# Patient Record
Sex: MALE | Race: WHITE | NOT HISPANIC OR LATINO | Employment: FULL TIME | URBAN - METROPOLITAN AREA
[De-identification: names, ages, dates, MRNs, and addresses within clinical notes are randomized per-mention and may not be internally consistent; named-entity substitution may affect disease eponyms.]

---

## 2017-11-18 ENCOUNTER — ALLIED HEALTH/NURSE VISIT (OUTPATIENT)
Dept: NURSING | Facility: CLINIC | Age: 42
End: 2017-11-18
Payer: COMMERCIAL

## 2017-11-18 DIAGNOSIS — Z23 NEED FOR PROPHYLACTIC VACCINATION AND INOCULATION AGAINST INFLUENZA: Primary | ICD-10-CM

## 2017-11-18 PROCEDURE — 99207 ZZC NO CHARGE NURSE ONLY: CPT

## 2017-11-18 PROCEDURE — 90686 IIV4 VACC NO PRSV 0.5 ML IM: CPT

## 2017-11-18 PROCEDURE — 90471 IMMUNIZATION ADMIN: CPT

## 2017-11-18 NOTE — MR AVS SNAPSHOT
"              After Visit Summary   2017    Denny Jackson    MRN: [id number]           Patient Information     Date Of Birth          1975        Visit Information        Provider Department      2017 11:30 [place] [facility] [place]        Today's Diagnoses     Need for prophylactic vaccination and inoculation against influenza    -  1       Follow-ups after your visit        Who to contact     If you have questions or need follow up information about today's clinic visit or your schedule please contact [facility] directly at [phone].  Normal or non-critical lab and imaging results will be communicated to you by [employer], letter or phone within 4 business days after the clinic has received the results. If you do not hear from us within 7 days, please contact the clinic through [employer] or phone. If you have a critical or abnormal lab result, we will notify you by phone as soon as possible.  Submit refill requests through [email or web address] or call your pharmacy and they will forward the refill request to us. Please allow 3 business days for your refill to be completed.          Additional Information About Your Visit        MyChart Information     [email or web address] lets you send messages to your doctor, view your test results, renew your prescriptions, schedule appointments and more. To sign up, go to [email or web address] . Click on \"Log in\" on the left side of the screen, which will take you to the Welcome page. Then click on \"Sign up Now\" on the right side of the page.     You will be asked to enter the access code listed below, as well as some personal information. Please follow the directions to create your username and password.     Your access code is: [email or web address]  Expires: 2018 11:49 AM     Your access code will  in 90 days. If you need help or a new code, please call your [facility] or [phone].        Care EveryWhere ID     This is your Care " EveryWhere ID. This could be used by other organizations to access your [facility] medical records  [id number]         Blood Pressure from Last 3 Encounters:   03/24/16 110/78   03/02/16 130/88   02/22/13 116/70    Weight from Last 3 Encounters:   03/24/16 188 lb (85.3 kg)   03/02/16 189 lb (85.7 kg)   02/22/13 190 lb (86.2 kg)              We Performed the Following     FLU VAC, SPLIT VIRUS IM > 3 YO (QUADRIVALENT) [[id number]]        Primary Care Provider Office Phone # Fax #    Shante Branch [phone] [address] [facility] [address]        Equal Access to Services     JONAH JON : Hadii srikanth gonzaleso Lorenza, waaxda luqadaha, qaybta kaalmada adeegyada, uli massey . So [facility] [phone].    ATENCIÓN: Si habla español, tiene a miller disposición servicios gratuitos de asistencia lingüística. Mariah al [phone].    We comply with applicable federal civil rights laws and [place] laws. We do not discriminate on the basis of race, color, national origin, age, disability, sex, sexual orientation, or gender identity.            Thank you!     Thank you for choosing [facility]  for your care. Our goal is always to provide you with excellent care. Hearing back from our patients is one way we can continue to improve our services. Please take a few minutes to complete the written survey that you may receive in the mail after your visit with us. Thank you!             Your Updated Medication List - Protect others around you: Learn how to safely use, store and throw away your medicines at www.disposemymeds.org.      Notice  As of 11/18/2017 11:49 AM    You have not been prescribed any medications.

## 2017-11-18 NOTE — PROGRESS NOTES

## 2018-02-23 ENCOUNTER — OFFICE VISIT (OUTPATIENT)
Dept: FAMILY MEDICINE | Facility: CLINIC | Age: 43
End: 2018-02-23
Payer: COMMERCIAL

## 2018-02-23 VITALS
HEART RATE: 78 BPM | BODY MASS INDEX: 26.4 KG/M2 | SYSTOLIC BLOOD PRESSURE: 136 MMHG | TEMPERATURE: 98.3 F | DIASTOLIC BLOOD PRESSURE: 85 MMHG | RESPIRATION RATE: 14 BRPM | OXYGEN SATURATION: 99 % | WEIGHT: 184 LBS

## 2018-02-23 DIAGNOSIS — K13.29 TONGUE PLAQUE: ICD-10-CM

## 2018-02-23 DIAGNOSIS — B97.89 VIRAL SINUSITIS: Primary | ICD-10-CM

## 2018-02-23 DIAGNOSIS — N48.9 PENILE LESION: ICD-10-CM

## 2018-02-23 DIAGNOSIS — J32.9 VIRAL SINUSITIS: Primary | ICD-10-CM

## 2018-02-23 LAB
KOH PREP SPEC: NORMAL
SPECIMEN SOURCE: NORMAL

## 2018-02-23 PROCEDURE — 99213 OFFICE O/P EST LOW 20 MIN: CPT | Mod: 25 | Performed by: PHYSICIAN ASSISTANT

## 2018-02-23 PROCEDURE — 87210 SMEAR WET MOUNT SALINE/INK: CPT | Performed by: PHYSICIAN ASSISTANT

## 2018-02-23 PROCEDURE — 54056 CRYOSURGERY PENIS LESION(S): CPT | Performed by: PHYSICIAN ASSISTANT

## 2018-02-23 NOTE — MR AVS SNAPSHOT
After Visit Summary   2/23/2018    Denny Jackson    MRN: [id number]           Patient Information     Date Of Birth          1975        Visit Information        Provider Department      2/23/2018 1:15 PM Sunny Dexter PA-C [facility]        Today's Diagnoses     Viral sinusitis    -  1    Tongue plaque          Care Instructions      Sinusitis (No Antibiotics)    The sinuses are air-filled spaces within the bones of the face. They connect to the inside of the nose. Sinusitis is an inflammation of the tissue lining the sinus cavity. Sinus inflammation can occur during a cold. It can also be due to allergies to pollens and other particles in the air. It can cause symptoms such as sinus congestion, headache, sore throat, facial swelling and fullness. It may also cause a low-grade fever. No infection is present, and no antibiotic treatment is needed.  Home care    Drink plenty of water, hot tea, and other liquids. This may help thin mucus. It also may promote sinus drainage.    Heat may help soothe painful areas of the face. Use a towel soaked in hot water. Or,  the shower and direct the hot spray onto your face. Using a vaporizer along with a menthol rub at night may also help.     An expectorant containing guaifenesin may help thin the mucus and promote drainage from the sinuses.    Over-the-counter decongestants may be used unless a similar medicine was prescribed. Nasal sprays work the fastest. Use one that contains phenylephrine or oxymetazoline. First blow the nose gently. Then use the spray. Do not use these medicines more often than directed on the label or symptoms may get worse. You may also use tablets containing pseudoephedrine. Avoid products that combine ingredients, because side effects may be increased. Read labels. You can also ask the pharmacist for help. (NOTE: Persons with high blood pressure should not use decongestants. They can raise blood  pressure.)    Over-the-counter antihistamines may help if allergies contributed to your sinusitis.      Use acetaminophen or ibuprofen to control pain, unless another pain medicine was prescribed. (If you have chronic liver or kidney disease or ever had a stomach ulcer, talk with your doctor before using these medicines. Aspirin should never be used in anyone under 18 years of age who is ill with a fever. It may cause severe liver damage.)    Use nasal rinses or irrigation as instructed by your health care provider.    Don't smoke. This can worsen symptoms.  Follow-up care  Follow up with your healthcare provider or our staff if you are not improving within the next week.  When to seek medical advice  Call your healthcare provider if any of these occur:    Green or yellow discharge from the nose or into the throat    Facial pain or headache becoming more severe    Stiff neck    Unusual drowsiness or confusion    Swelling of the forehead or eyelids    Vision problems, including blurred or double vision    Fever of 100.4 F (38 C) or higher, or as directed by your healthcare provider    Seizure    Breathing problems    Symptoms not resolving within 10 days  Date Last Reviewed: 4/13/2015 2000-2017 [employer]. [address] [place] [address]. All rights reserved. This information is not intended as a substitute for professional medical care. Always follow your healthcare professional's instructions.                Follow-ups after your visit        Who to contact     If you have questions or need follow up information about today's clinic visit or your schedule please contact [facility] directly at [phone].  Normal or non-critical lab and imaging results will be communicated to you by MyChart, letter or phone within 4 business days after the clinic has received the results. If you do not hear from us within 7 days, please contact the clinic through MyChart or phone. If  "you have a critical or abnormal lab result, we will notify you by phone as soon as possible.  Submit refill requests through [employer] or call your pharmacy and they will forward the refill request to us. Please allow 3 business days for your refill to be completed.          Additional Information About Your Visit        [employer] Information     [employer] lets you send messages to your doctor, view your test results, renew your prescriptions, schedule appointments and more. To sign up, go to [email or web address] . Click on \"Log in\" on the left side of the screen, which will take you to the Welcome page. Then click on \"Sign up Now\" on the right side of the page.     You will be asked to enter the access code listed below, as well as some personal information. Please follow the directions to create your username and password.     Your access code is: [email or web address]  Expires: 2018  2:11 PM     Your access code will  in 90 days. If you need help or a new code, please call your [facility] clinic or [phone].        Care EveryWhere ID     This is your Care EveryWhere ID. This could be used by other organizations to access your [facility] medical records  [id number]        Your Vitals Were     Pulse Temperature Respirations Pulse Oximetry BMI (Body Mass Index)       78 98.3  F (36.8  C) (Oral) 14 99% 26.4 kg/m2        Blood Pressure from Last 3 Encounters:   18 136/85   16 110/78   16 130/88    Weight from Last 3 Encounters:   18 184 lb (83.5 kg)   16 188 lb (85.3 kg)   16 189 lb (85.7 kg)              We Performed the Following     KOH prep (Other than skin, nails, hair)        Primary Care Provider Office Phone # Fax #    Shantejack Branch [phone] [address] [facility] [address]        Equal Access to Services     JONAH JON AH: Cristina Britt, maureen guzman, uli villash la'aan " ah. So [facility] [phone].    ATENCIÓN: Si habla dale, tiene a miller disposición servicios gratuitos de asistencia lingüística. Mariah al [phone].    We comply with applicable federal civil rights laws and [place] laws. We do not discriminate on the basis of race, color, national origin, age, disability, sex, sexual orientation, or gender identity.            Thank you!     Thank you for choosing [facility]  for your care. Our goal is always to provide you with excellent care. Hearing back from our patients is one way we can continue to improve our services. Please take a few minutes to complete the written survey that you may receive in the mail after your visit with us. Thank you!             Your Updated Medication List - Protect others around you: Learn how to safely use, store and throw away your medicines at www.disposemymeds.org.      Notice  As of 2/23/2018  2:11 PM    You have not been prescribed any medications.

## 2018-02-23 NOTE — PATIENT INSTRUCTIONS
Sinusitis (No Antibiotics)    The sinuses are air-filled spaces within the bones of the face. They connect to the inside of the nose. Sinusitis is an inflammation of the tissue lining the sinus cavity. Sinus inflammation can occur during a cold. It can also be due to allergies to pollens and other particles in the air. It can cause symptoms such as sinus congestion, headache, sore throat, facial swelling and fullness. It may also cause a low-grade fever. No infection is present, and no antibiotic treatment is needed.  Home care    Drink plenty of water, hot tea, and other liquids. This may help thin mucus. It also may promote sinus drainage.    Heat may help soothe painful areas of the face. Use a towel soaked in hot water. Or,  the shower and direct the hot spray onto your face. Using a vaporizer along with a menthol rub at night may also help.     An expectorant containing guaifenesin may help thin the mucus and promote drainage from the sinuses.    Over-the-counter decongestants may be used unless a similar medicine was prescribed. Nasal sprays work the fastest. Use one that contains phenylephrine or oxymetazoline. First blow the nose gently. Then use the spray. Do not use these medicines more often than directed on the label or symptoms may get worse. You may also use tablets containing pseudoephedrine. Avoid products that combine ingredients, because side effects may be increased. Read labels. You can also ask the pharmacist for help. (NOTE: Persons with high blood pressure should not use decongestants. They can raise blood pressure.)    Over-the-counter antihistamines may help if allergies contributed to your sinusitis.      Use acetaminophen or ibuprofen to control pain, unless another pain medicine was prescribed. (If you have chronic liver or kidney disease or ever had a stomach ulcer, talk with your doctor before using these medicines. Aspirin should never be used in anyone under 18 years of age  who is ill with a fever. It may cause severe liver damage.)    Use nasal rinses or irrigation as instructed by your health care provider.    Don't smoke. This can worsen symptoms.  Follow-up care  Follow up with your healthcare provider or our staff if you are not improving within the next week.  When to seek medical advice  Call your healthcare provider if any of these occur:    Green or yellow discharge from the nose or into the throat    Facial pain or headache becoming more severe    Stiff neck    Unusual drowsiness or confusion    Swelling of the forehead or eyelids    Vision problems, including blurred or double vision    Fever of 100.4 F (38 C) or higher, or as directed by your healthcare provider    Seizure    Breathing problems    Symptoms not resolving within 10 days  Date Last Reviewed: 4/13/2015 2000-2017 [employer]. [address] [place] [address], [place] [address]. All rights reserved. This information is not intended as a substitute for professional medical care. Always follow your healthcare professional's instructions.

## 2018-02-23 NOTE — PROGRESS NOTES
SUBJECTIVE:   Denny Jackson is a 42 year old male who presents to clinic today for the following health issues:      Acute Illness   Acute illness concerns: sinus, cough  Onset: Tuesday    Fever: no     Chills/Sweats: YES-chills     Headache (location?): no     Sinus Pressure:YES    Conjunctivitis:  no    Ear Pain: no    Rhinorrhea: YES    Congestion: YES    Sore Throat: YES     Cough: YES-non-productive    Wheeze: no     Decreased Appetite: YES     Nausea: no     Vomiting: no     Diarrhea:  no    Dysuria/Freq.: no    Fatigue/Achiness: YES    Sick/Strep Exposure: wife, son      Therapies Tried and outcome: OTC nasal spray, mucinex      Lesions on penis. Present for years. Concerned for possible skin cancer.     Problem list and histories reviewed & adjusted, as indicated.  Additional history: as documented    Patient Active Problem List   Diagnosis     CARDIOVASCULAR SCREENING; LDL GOAL LESS THAN 160     Plantar warts     Past Surgical History:   Procedure Laterality Date     NO HISTORY OF SURGERY         Social History   Substance Use Topics     Smoking status: Never Smoker     Smokeless tobacco: Never Used     Alcohol use No     Family History   Problem Relation Age of Onset     DIABETES Father      Prostate Cancer Father      Hypertension Father      DIABETES Maternal Grandmother      DIABETES Maternal Grandfather          No current outpatient prescriptions on file.     Allergies   Allergen Reactions     Ibuprofen Hives     Rash/hives on face     BP Readings from Last 3 Encounters:   02/23/18 136/85   03/24/16 110/78   03/02/16 130/88    Wt Readings from Last 3 Encounters:   02/23/18 184 lb (83.5 kg)   03/24/16 188 lb (85.3 kg)   03/02/16 189 lb (85.7 kg)                    Reviewed and updated as needed this visit by clinical staff  Tobacco  Allergies  Meds  Problems  Med Hx  Surg Hx  Fam Hx  Soc Hx        Reviewed and updated as needed this visit by Provider  Allergies  Meds  Problems          ROS:  Constitutional, HEENT, cardiovascular, pulmonary, gi and gu systems are negative, except as otherwise noted.    OBJECTIVE:     /85 (BP Location: Right arm, Patient Position: Chair, Cuff Size: Adult Large)  Pulse 78  Temp 98.3  F (36.8  C) (Oral)  Resp 14  Wt 184 lb (83.5 kg)  SpO2 99%  BMI 26.4 kg/m2  Body mass index is 26.4 kg/(m^2).  GENERAL: healthy, alert and no distress  EYES: Eyes grossly normal to inspection, PERRL and conjunctivae and sclerae normal  HENT: normal cephalic/atraumatic, both ears: clear effusion, nasal mucosa edematous , tongue with white plaque present, oropharynx clear, oral mucous membranes moist and sinuses: not tender  RESP: lungs clear to auscultation - no rales, rhonchi or wheezes  CV: regular rates and rhythm, normal S1 S2, no S3 or S4 and no murmur, click or rub  SKIN: 2 papular lesions noted on proximal shaft of penis. Skin colored.   PSYCH: mentation appears normal, affect normal/bright    Diagnostic Test Results:  Results for orders placed or performed in visit on 02/23/18 (from the past 24 hour(s))   KOH prep (Other than skin, nails, hair)   Result Value Ref Range    Specimen Description Mouth     KOH Prep No fungal elements seen        ASSESSMENT/PLAN:     (J32.9,  B97.89) Viral sinusitis  (primary encounter diagnosis)  Comment: exam benign. Given course length, likely viral. flonase and claritin-d discussed. If no improvement in 1 week, patient to call and abx to be used.   Plan:     (N48.9) Penile lesion  Comment: skin tag vs genital wart. Discussed treatment options including topical vs cryo. Patient would like to proceed with cryo.   Plan: DESTRUCT BENIGN LESION, UP TO 14     Patient informed of risks (permanent scarring, infection, light or dark discoloration, bleeding, infection, weakness, numbness and recurrence of the lesion) and benefits of the procedure and verbal informed consent obtained.      The areas are treated with liquid nitrogen therapy,  frozen until ice ball extended 3 mm beyond lesion, allowed to thaw, and treated again. The patient tolerated procedure well. The patient was instructed on post-op care, warned that there may be blister formation, redness and pain. Recommend OTC analgesia as needed for pain.      Condition:  Stable      Complications:  none.      Plan:  1. Instructed to keep the area dry and covered for 24-48h and clean thereafter.  2. Warning signs of infection were reviewed.    3. Recommended that the patient use OTC analgesics as needed for pain.   4. Return prn  .    (K13.29) Tongue plaque  Comment: noted on exam. koh negative. Likely secondary to mild dehydration. Recommending increased fluids.   Plan: KOH prep (Other than skin, nails, hair)              Follow up: as above     Sunny Dexter PA-C  [facility]

## 2018-02-27 ENCOUNTER — OFFICE VISIT (OUTPATIENT)
Dept: FAMILY MEDICINE | Facility: CLINIC | Age: 43
End: 2018-02-27
Payer: COMMERCIAL

## 2018-02-27 VITALS
OXYGEN SATURATION: 99 % | BODY MASS INDEX: 26.77 KG/M2 | WEIGHT: 187 LBS | HEIGHT: 70 IN | DIASTOLIC BLOOD PRESSURE: 80 MMHG | SYSTOLIC BLOOD PRESSURE: 130 MMHG | RESPIRATION RATE: 12 BRPM | TEMPERATURE: 98.2 F | HEART RATE: 70 BPM

## 2018-02-27 DIAGNOSIS — J06.9 VIRAL URI WITH COUGH: ICD-10-CM

## 2018-02-27 DIAGNOSIS — Z00.00 ROUTINE GENERAL MEDICAL EXAMINATION AT A HEALTH CARE FACILITY: Primary | ICD-10-CM

## 2018-02-27 LAB
CHOLEST SERPL-MCNC: 181 MG/DL
ERYTHROCYTE [DISTWIDTH] IN BLOOD BY AUTOMATED COUNT: 13.3 % (ref 10–15)
GLUCOSE SERPL-MCNC: 95 MG/DL (ref 70–99)
HCT VFR BLD AUTO: 45.9 % (ref 40–53)
HDLC SERPL-MCNC: 37 MG/DL
HGB BLD-MCNC: 15.6 G/DL (ref 13.3–17.7)
LDLC SERPL CALC-MCNC: 121 MG/DL
MCH RBC QN AUTO: 29.4 PG (ref 26.5–33)
MCHC RBC AUTO-ENTMCNC: 34 G/DL (ref 31.5–36.5)
MCV RBC AUTO: 87 FL (ref 78–100)
NONHDLC SERPL-MCNC: 144 MG/DL
PLATELET # BLD AUTO: 148 10E9/L (ref 150–450)
RBC # BLD AUTO: 5.3 10E12/L (ref 4.4–5.9)
TRIGL SERPL-MCNC: 114 MG/DL
WBC # BLD AUTO: 6.9 10E9/L (ref 4–11)

## 2018-02-27 PROCEDURE — 82947 ASSAY GLUCOSE BLOOD QUANT: CPT | Performed by: PHYSICIAN ASSISTANT

## 2018-02-27 PROCEDURE — 85027 COMPLETE CBC AUTOMATED: CPT | Performed by: PHYSICIAN ASSISTANT

## 2018-02-27 PROCEDURE — 99396 PREV VISIT EST AGE 40-64: CPT | Performed by: PHYSICIAN ASSISTANT

## 2018-02-27 PROCEDURE — 80061 LIPID PANEL: CPT | Performed by: PHYSICIAN ASSISTANT

## 2018-02-27 PROCEDURE — 36415 COLL VENOUS BLD VENIPUNCTURE: CPT | Performed by: PHYSICIAN ASSISTANT

## 2018-02-27 RX ORDER — ALBUTEROL SULFATE 90 UG/1
2 AEROSOL, METERED RESPIRATORY (INHALATION) EVERY 6 HOURS PRN
Qty: 1 INHALER | Refills: 0 | Status: SHIPPED | OUTPATIENT
Start: 2018-02-27 | End: 2018-08-22

## 2018-02-27 NOTE — LETTER
February 27, 2018      Denny Jackson  [address] [facility] [address]        Dear ,    We are writing to inform you of your test results.    The results from your recent labs have all returned. Your blood sugar and complete blood count look normal. You'll notice your platelets were slightly decreased, but nothing concerning. The normal range for this is so wide sometimes it can look a little off.     Your cholesterol is higher than ideal, but much improved from last year. In terms of management of this, you may have been improving some aspects of your diet. I recommend continuing to work on this where possible. A low fat, carbohydrate-controlled diet should be adopted. Saturated fat should not make up more than 7% of your total daily calories, carbohydrates should be restricted to 50% to 60% of daily calories, and simple sugars like sucrose should be avoided. You may also consider increasing your intake of oily fish by either an increase in your fish intake or a fish oil supplement to at least 2 servings per week. Alcohol should also be limited and at least 30 minutes of aerobic exercise 5 days a week would be beneficial to implement.     -Ryder Dexter, PAC     Resulted Orders   Lipid panel reflex to direct LDL Fasting   Result Value Ref Range    Cholesterol 181 <200 mg/dL    Triglycerides 114 <150 mg/dL      Comment:      Fasting specimen    HDL Cholesterol 37 (L) >39 mg/dL    LDL Cholesterol Calculated 121 (H) <100 mg/dL      Comment:      Above desirable:  100-129 mg/dl  Borderline High:  130-159 mg/dL  High:             160-189 mg/dL  Very high:       >189 mg/dl      Non HDL Cholesterol 144 (H) <130 mg/dL      Comment:      Above Desirable:  130-159 mg/dl  Borderline high:  160-189 mg/dl  High:             190-219 mg/dl  Very high:       >219 mg/dl     Glucose   Result Value Ref Range    Glucose 95 70 - 99 mg/dL      Comment:      Fasting specimen   CBC with platelets   Result Value Ref Range     WBC 6.9 4.0 - 11.0 10e9/L    RBC Count 5.30 4.4 - 5.9 10e12/L    Hemoglobin 15.6 13.3 - 17.7 g/dL    Hematocrit 45.9 40.0 - 53.0 %    MCV 87 78 - 100 fl    MCH 29.4 26.5 - 33.0 pg    MCHC 34.0 31.5 - 36.5 g/dL    RDW 13.3 10.0 - 15.0 %    Platelet Count 148 (L) 150 - 450 10e9/L       If you have any questions or concerns, please call the clinic at the number listed above.       Sincerely,        Sunny Dexter PA-C

## 2018-02-27 NOTE — PROGRESS NOTES
SUBJECTIVE:   CC: Denny Jackson is an 42 year old male who presents for preventative health visit.     Physical   Annual:     Getting at least 3 servings of Calcium per day::  Yes    Bi-annual eye exam::  Yes    Dental care twice a year::  Yes    Sleep apnea or symptoms of sleep apnea::  None    Diet::  Regular (no restrictions)    Frequency of exercise::  1 day/week    Duration of exercise::  Less than 15 minutes    Taking medications regularly::  Yes    Medication side effects::  None    Additional concerns today::  No            URI symptoms improving. Still having productive cough in the AM. Sinuses improving.       Today's PHQ-2 Score:   PHQ-2 ( 1999 [employer]) 2/27/2018   Q1: Little interest or pleasure in doing things 0   Q2: Feeling down, depressed or hopeless 0   PHQ-2 Score 0   Q1: Little interest or pleasure in doing things Not at all   Q2: Feeling down, depressed or hopeless Not at all   PHQ-2 Score 0       Abuse: Current or Past(Physical, Sexual or Emotional)- No  Do you feel safe in your environment - Yes    Social History   Substance Use Topics     Smoking status: Never Smoker     Smokeless tobacco: Never Used     Alcohol use No     No flowsheet data found.No flowsheet data found.    Last PSA: No results found for: PSA    Reviewed orders with patient. Reviewed health maintenance and updated orders accordingly - Yes  Labs reviewed in EPIC  BP Readings from Last 3 Encounters:   02/27/18 130/80   02/23/18 136/85   03/24/16 110/78    Wt Readings from Last 3 Encounters:   02/27/18 187 lb (84.8 kg)   02/23/18 184 lb (83.5 kg)   03/24/16 188 lb (85.3 kg)                  Patient Active Problem List   Diagnosis     CARDIOVASCULAR SCREENING; LDL GOAL LESS THAN 160     Plantar warts     Past Surgical History:   Procedure Laterality Date     NO HISTORY OF SURGERY         Social History   Substance Use Topics     Smoking status: Never Smoker     Smokeless tobacco: Never Used     Alcohol use No     Family History  "  Problem Relation Age of Onset     DIABETES Father      Prostate Cancer Father      Hypertension Father      DIABETES Maternal Grandmother      DIABETES Maternal Grandfather          Current Outpatient Prescriptions   Medication Sig Dispense Refill     albuterol (PROAIR HFA/PROVENTIL HFA/VENTOLIN HFA) 108 (90 BASE) MCG/ACT Inhaler Inhale 2 puffs into the lungs every 6 hours as needed for shortness of breath / dyspnea or wheezing 1 Inhaler 0     Allergies   Allergen Reactions     Ibuprofen Hives     Rash/hives on face       Reviewed and updated as needed this visit by clinical staff  Tobacco  Allergies  Meds  Problems  Med Hx  Surg Hx  Fam Hx  Soc Hx          Reviewed and updated as needed this visit by Provider  Allergies  Meds  Problems        Past Medical History:   Diagnosis Date     NO ACTIVE PROBLEMS       Past Surgical History:   Procedure Laterality Date     NO HISTORY OF SURGERY         Review of Systems: other than above,   C: NEGATIVE for fever, chills, change in weight  I: NEGATIVE for worrisome rashes, moles or lesions  E: NEGATIVE for vision changes or irritation  ENT: NEGATIVE for ear, mouth and throat problems  R: NEGATIVE for significant cough or SOB  CV: NEGATIVE for chest pain, palpitations or peripheral edema  GI: NEGATIVE for nausea, abdominal pain, heartburn, or change in bowel habits   male: negative for dysuria, hematuria, decreased urinary stream, erectile dysfunction, urethral discharge  M: NEGATIVE for significant arthralgias or myalgia  N: NEGATIVE for weakness, dizziness or paresthesias  P: NEGATIVE for changes in mood or affect    OBJECTIVE:   /80 (BP Location: Right arm, Patient Position: Chair, Cuff Size: Adult Regular)  Pulse 70  Temp 98.2  F (36.8  C) (Oral)  Resp 12  Ht 5' 10\" (1.778 m)  Wt 187 lb (84.8 kg)  SpO2 99%  BMI 26.83 kg/m2    Physical Exam  GENERAL: healthy, alert and no distress  EYES: Eyes grossly normal to inspection, PERRL and conjunctivae " "and sclerae normal  HENT: ear canals and TM's normal, nose and mouth without ulcers or lesions  NECK: no adenopathy, no asymmetry, masses, or scars and thyroid normal to palpation  RESP: no rales , no rhonchi and inspiratory wheezes R upper posterior and R lower posterior  CV: regular rate and rhythm, normal S1 S2, no S3 or S4, no murmur, click or rub, no peripheral edema and peripheral pulses strong  ABDOMEN: soft, nontender, no hepatosplenomegaly, no masses and bowel sounds normal   (male): testicles normal without atrophy or masses, no hernias and penis normal without urethral discharge. Previous papular lesion with ecchymosis.   MS: no gross musculoskeletal defects noted, no edema  SKIN: no suspicious lesions or rashes  NEURO: Normal strength and tone, mentation intact and speech normal  PSYCH: mentation appears normal, affect normal/bright  LYMPH: normal ant/post cervical, supraclavicular nodes    ASSESSMENT/PLAN:   (Z00.00) Routine general medical examination at a health care facility  (primary encounter diagnosis)  Comment: normal exam   Plan: Lipid panel reflex to direct LDL Fasting,         Glucose, CBC with platelets            (J06.9,  B97.89) Viral URI with cough  Comment: ongoing. Though states improved, new wheezing on exam. Albuterol prn for this. Follow up if worsening.   Plan: albuterol (PROAIR HFA/PROVENTIL HFA/VENTOLIN         HFA) 108 (90 BASE) MCG/ACT Inhaler        -Medication use and side effects discussed with the patient. Patient is in complete understanding and agreement with plan.         COUNSELING:   Reviewed preventive health counseling, as reflected in patient instructions       Regular exercise       Healthy diet/nutrition         reports that he has never smoked. He has never used smokeless tobacco.    Estimated body mass index is 26.83 kg/(m^2) as calculated from the following:    Height as of this encounter: 5' 10\" (1.778 m).    Weight as of this encounter: 187 lb (84.8 kg). "   Weight management plan: Discussed healthy diet and exercise guidelines and patient will follow up in 12 months in clinic to re-evaluate.    Counseling Resources:  ATP IV Guidelines  Pooled Cohorts Equation Calculator  FRAX Risk Assessment  ICSI Preventive Guidelines  Dietary Guidelines for Americans, 2010  USDA's MyPlate  ASA Prophylaxis  Lung CA Screening    Sunny Dexter PA-C  [facility]  Answers for HPI/ROS submitted by the patient on 2/27/2018   PHQ-2 Score: 0

## 2018-02-27 NOTE — MR AVS SNAPSHOT
After Visit Summary   2/27/2018    Denny Jackson    MRN: [id number]           Patient Information     Date Of Birth          1975        Visit Information        Provider Department      2/27/2018 7:45 AM Sunny Dexter PA-C [facility]        Today's Diagnoses     Routine general medical examination at a health care facility    -  1      Care Instructions      Preventive Health Recommendations  Male Ages 40 to 49    Yearly exam:             See your health care provider every year in order to  o   Review health changes.   o   Discuss preventive care.    o   Review your medicines if your doctor has prescribed any.    You should be tested each year for STDs (sexually transmitted diseases) if you re at risk.     Have a cholesterol test every 5 years.     Have a colonoscopy (test for colon cancer) if someone in your family has had colon cancer or polyps before age 50.     After age 45, have a diabetes test (fasting glucose). If you are at risk for diabetes, you should have this test every 3 years.      Talk with your health care provider about whether or not a prostate cancer screening test (PSA) is right for you.    Shots: Get a flu shot each year. Get a tetanus shot every 10 years.     Nutrition:    Eat at least 5 servings of fruits and vegetables daily.     Eat whole-grain bread, whole-wheat pasta and brown rice instead of white grains and rice.     Talk to your provider about Calcium and Vitamin D.     Lifestyle    Exercise for at least 150 minutes a week (30 minutes a day, 5 days a week). This will help you control your weight and prevent disease.     Limit alcohol to one drink per day.     No smoking.     Wear sunscreen to prevent skin cancer.     See your dentist every six months for an exam and cleaning.              Follow-ups after your visit        Who to contact     If you have questions or need follow up information about today's clinic visit or your schedule  "please contact [facility] directly at [phone].  Normal or non-critical lab and imaging results will be communicated to you by MyChart, letter or phone within 4 business days after the clinic has received the results. If you do not hear from us within 7 days, please contact the clinic through MyChart or phone. If you have a critical or abnormal lab result, we will notify you by phone as soon as possible.  Submit refill requests through [employer] or call your pharmacy and they will forward the refill request to us. Please allow 3 business days for your refill to be completed.          Additional Information About Your Visit        Agrividaharyoucalc Information     [employer] lets you send messages to your doctor, view your test results, renew your prescriptions, schedule appointments and more. To sign up, go to [email or web address] . Click on \"Log in\" on the left side of the screen, which will take you to the Welcome page. Then click on \"Sign up Now\" on the right side of the page.     You will be asked to enter the access code listed below, as well as some personal information. Please follow the directions to create your username and password.     Your access code is: [email or web address]  Expires: 2018  2:11 PM     Your access code will  in 90 days. If you need help or a new code, please call your [facility] clinic or [phone].        Care EveryWhere ID     This is your Care EveryWhere ID. This could be used by other organizations to access your [facility] medical records  [id number]        Your Vitals Were     Pulse Temperature Respirations Height Pulse Oximetry BMI (Body Mass Index)    70 98.2  F (36.8  C) (Oral) 12 5' 10\" (1.778 m) 99% 26.83 kg/m2       Blood Pressure from Last 3 Encounters:   18 130/80   18 136/85   16 110/78    Weight from Last 3 Encounters:   18 187 lb (84.8 kg)   18 184 lb (83.5 kg)   16 188 lb (85.3 kg)              We Performed the " Following     CBC with platelets     Glucose     Lipid panel reflex to direct LDL Fasting        Primary Care Provider Office Phone # Fax #    Shante Branch [phone] [address] [facility] [address]        Equal Access to Services     JONAH JON : Hadsuze lozano hadyamileo Soomaali, waaxda luqadaha, qaybta kaalmada adeegyada, waxlaura luis miguel hayminaljack dacostarembertoblossom bridges. So [facility] [phone].    ATENCIÓN: Si habla español, tiene a miller disposición servicios gratuitos de asistencia lingüística. Llame al [phone].    We comply with applicable federal civil rights laws and [place] laws. We do not discriminate on the basis of race, color, national origin, age, disability, sex, sexual orientation, or gender identity.            Thank you!     Thank you for choosing [facility]  for your care. Our goal is always to provide you with excellent care. Hearing back from our patients is one way we can continue to improve our services. Please take a few minutes to complete the written survey that you may receive in the mail after your visit with us. Thank you!             Your Updated Medication List - Protect others around you: Learn how to safely use, store and throw away your medicines at www.disposemymeds.org.      Notice  As of 2/27/2018  8:02 AM    You have not been prescribed any medications.

## 2018-08-22 ENCOUNTER — OFFICE VISIT (OUTPATIENT)
Dept: FAMILY MEDICINE | Facility: CLINIC | Age: 43
End: 2018-08-22
Payer: COMMERCIAL

## 2018-08-22 VITALS
HEIGHT: 70 IN | RESPIRATION RATE: 18 BRPM | SYSTOLIC BLOOD PRESSURE: 120 MMHG | DIASTOLIC BLOOD PRESSURE: 80 MMHG | HEART RATE: 86 BPM | OXYGEN SATURATION: 96 % | WEIGHT: 180 LBS | BODY MASS INDEX: 25.77 KG/M2 | TEMPERATURE: 98.1 F

## 2018-08-22 DIAGNOSIS — M79.671 RIGHT FOOT PAIN: ICD-10-CM

## 2018-08-22 DIAGNOSIS — Z30.2 REQUEST FOR STERILIZATION: ICD-10-CM

## 2018-08-22 DIAGNOSIS — F43.21 ADJUSTMENT DISORDER WITH DEPRESSED MOOD: Primary | ICD-10-CM

## 2018-08-22 PROCEDURE — 99214 OFFICE O/P EST MOD 30 MIN: CPT | Performed by: FAMILY MEDICINE

## 2018-08-22 RX ORDER — FLUOXETINE 10 MG/1
10 CAPSULE ORAL DAILY
Qty: 90 CAPSULE | Refills: 1 | Status: SHIPPED | OUTPATIENT
Start: 2018-08-22 | End: 2019-03-12

## 2018-08-22 NOTE — PROGRESS NOTES
"  SUBJECTIVE:   Denny Jackson is a 42 year old male who presents to clinic today for the following health issues:      Musculoskeletal problem/pain      Duration: on and off for a month, more the past two weeks    Description  Location: right foot    Intensity:  moderate    Accompanying signs and symptoms: none    History  Previous similar problem: no   Previous evaluation:  none    Precipitating or alleviating factors:  Trauma or overuse: YES- just started running again, hiking a lot recently   Aggravating factors include: climbing stairs and more in the mooring      Therapies tried and outcome: nothing  1) more emotional lately.  Wife got diagnosed with MS a few years ago.  Lots of stress since then.  Cries more easily. Switched jobs a year ago and now working at home.  He is seeing a therapist.   2) discuss a vasectomy         Problem list and histories reviewed & adjusted, as indicated.  Additional history: as documented    Patient Active Problem List   Diagnosis     CARDIOVASCULAR SCREENING; LDL GOAL LESS THAN 160     Plantar warts     Past Surgical History:   Procedure Laterality Date     NO HISTORY OF SURGERY         Social History   Substance Use Topics     Smoking status: Never Smoker     Smokeless tobacco: Never Used     Alcohol use No     Family History   Problem Relation Age of Onset     Diabetes Father      Prostate Cancer Father      Hypertension Father      Diabetes Maternal Grandmother      Diabetes Maternal Grandfather            Reviewed and updated as needed this visit by clinical staff       Reviewed and updated as needed this visit by Provider         ROS:  Constitutional, HEENT, cardiovascular, pulmonary, gi and gu systems are negative, except as otherwise noted.    OBJECTIVE:     /80 (BP Location: Right arm, Patient Position: Chair, Cuff Size: Adult Regular)  Pulse 86  Temp 98.1  F (36.7  C) (Oral)  Resp 18  Ht 5' 10\" (1.778 m)  Wt 180 lb (81.6 kg)  SpO2 96%  BMI 25.83 kg/m2  Body " mass index is 25.83 kg/(m^2).  GENERAL: healthy, alert and no distress  MS: Right foot with no obvious deformities or swelling.  Normal ROM and gait.  No TTP along bony landmarks.    SKIN: No erythema or bruising  PSYCH: mentation appears normal, affect normal/bright    ASSESSMENT/PLAN:     1. Adjustment disorder with depressed mood  - Start Prozac at low dose  - Continue weekly therapy appts  - FLUoxetine (PROZAC) 10 MG capsule; Take 1 capsule (10 mg) by mouth daily  Dispense: 90 capsule; Refill: 1    2. Right foot pain  - Most likely a combination of plantar fasciitis and mid-foot pain from arch collapse   - Advised insoles with arch support and RICE therapy     3. Request for sterilization  - Will get pt in for a visit with Dr. Quach to discuss vasectomy     Follow up in 4-6 weeks for depression check     Shante Branch DO  [facility]

## 2018-08-22 NOTE — MR AVS SNAPSHOT
"              After Visit Summary   8/22/2018    Denny Jackson    MRN: [id number]           Patient Information     Date Of Birth          1975        Visit Information        Provider Department      8/22/2018 2:20 PM Shante Branch,  [facility]        Today's Diagnoses     Adjustment disorder with depressed mood    -  1    Right foot pain           Follow-ups after your visit        Your next 10 appointments already scheduled     Aug 23, 2018 11:15 AM CDT   SHORT with Myke Quach MD   [facility] ([facility])    [address] [place] [phone]              Who to contact     If you have questions or need follow up information about today's clinic visit or your schedule please contact [facility] directly at [phone].  Normal or non-critical lab and imaging results will be communicated to you by MyChart, letter or phone within 4 business days after the clinic has received the results. If you do not hear from us within 7 days, please contact the clinic through MyChart or phone. If you have a critical or abnormal lab result, we will notify you by phone as soon as possible.  Submit refill requests through [employer] or call your pharmacy and they will forward the refill request to us. Please allow 3 business days for your refill to be completed.          Additional Information About Your Visit        MyChart Information     [employer] lets you send messages to your doctor, view your test results, renew your prescriptions, schedule appointments and more. To sign up, go to [email or web address] . Click on \"Log in\" on the left side of the screen, which will take you to the Welcome page. Then click on \"Sign up Now\" on the right side of the page.     You will be asked to enter the access code listed below, as well as some personal information. Please follow the directions to create your username and " "password.     Your access code is: [email or web address]  Expires: 2018  2:15 PM     Your access code will  in 90 days. If you need help or a new code, please call your [facility] or [phone].        Care EveryWhere ID     This is your Care EveryWhere ID. This could be used by other organizations to access your [facility] medical records  [id number]        Your Vitals Were     Pulse Temperature Respirations Height Pulse Oximetry BMI (Body Mass Index)    86 98.1  F (36.7  C) (Oral) 18 5' 10\" (1.778 m) 96% 25.83 kg/m2       Blood Pressure from Last 3 Encounters:   18 120/80   18 130/80   18 136/85    Weight from Last 3 Encounters:   18 180 lb (81.6 kg)   18 187 lb (84.8 kg)   18 184 lb (83.5 kg)              Today, you had the following     No orders found for display         Today's Medication Changes          These changes are accurate as of 18  3:13 PM.  If you have any questions, ask your nurse or doctor.               Start taking these medicines.        Dose/Directions    FLUoxetine 10 MG capsule   Commonly known as:  PROzac   Used for:  Adjustment disorder with depressed mood   Started by:  Shnate Branch DO        Dose:  10 mg   Take 1 capsule (10 mg) by mouth daily   Quantity:  90 capsule   Refills:  1         Stop taking these medicines if you haven't already. Please contact your care team if you have questions.     albuterol 108 (90 Base) MCG/ACT inhaler   Commonly known as:  PROAIR HFA/PROVENTIL HFA/VENTOLIN HFA   Stopped by:  Shante Branch DO                Where to get your medicines      These medications were sent to [facility] - [place], [place] - [address]  [address] [facility] [address]     Phone:  [phone]     FLUoxetine 10 MG capsule                Primary Care Provider Office Phone # Fax #    Shante Branch [phone] [address] [facility] [address]        Equal Access " to Services     JONAH JON : Cristina Britt, maureen guzman, qauli bhatia. So [facility] [phone].    ATENCIÓN: Si habla español, tiene a miller disposición servicios gratuitos de asistencia lingüística. Llame al [phone].    We comply with applicable federal civil rights laws and [place] laws. We do not discriminate on the basis of race, color, national origin, age, disability, sex, sexual orientation, or gender identity.            Thank you!     Thank you for choosing [facility]  for your care. Our goal is always to provide you with excellent care. Hearing back from our patients is one way we can continue to improve our services. Please take a few minutes to complete the written survey that you may receive in the mail after your visit with us. Thank you!             Your Updated Medication List - Protect others around you: Learn how to safely use, store and throw away your medicines at www.disposemymeds.org.          This list is accurate as of 8/22/18  3:13 PM.  Always use your most recent med list.                   Brand Name Dispense Instructions for use Diagnosis    FLUoxetine 10 MG capsule    PROzac    90 capsule    Take 1 capsule (10 mg) by mouth daily    Adjustment disorder with depressed mood

## 2018-08-23 ENCOUNTER — OFFICE VISIT (OUTPATIENT)
Dept: FAMILY MEDICINE | Facility: CLINIC | Age: 43
End: 2018-08-23
Payer: COMMERCIAL

## 2018-08-23 VITALS
TEMPERATURE: 98 F | HEART RATE: 66 BPM | BODY MASS INDEX: 26.2 KG/M2 | SYSTOLIC BLOOD PRESSURE: 133 MMHG | HEIGHT: 70 IN | RESPIRATION RATE: 16 BRPM | DIASTOLIC BLOOD PRESSURE: 72 MMHG | WEIGHT: 183 LBS

## 2018-08-23 DIAGNOSIS — Z30.09 ENCOUNTER FOR VASECTOMY COUNSELING: Primary | ICD-10-CM

## 2018-08-23 PROCEDURE — 99213 OFFICE O/P EST LOW 20 MIN: CPT | Performed by: FAMILY MEDICINE

## 2018-08-23 ASSESSMENT — ANXIETY QUESTIONNAIRES
6. BECOMING EASILY ANNOYED OR IRRITABLE: NOT AT ALL
GAD7 TOTAL SCORE: 0
1. FEELING NERVOUS, ANXIOUS, OR ON EDGE: NOT AT ALL
2. NOT BEING ABLE TO STOP OR CONTROL WORRYING: NOT AT ALL
7. FEELING AFRAID AS IF SOMETHING AWFUL MIGHT HAPPEN: NOT AT ALL
7. FEELING AFRAID AS IF SOMETHING AWFUL MIGHT HAPPEN: NOT AT ALL
GAD7 TOTAL SCORE: 0
5. BEING SO RESTLESS THAT IT IS HARD TO SIT STILL: NOT AT ALL
3. WORRYING TOO MUCH ABOUT DIFFERENT THINGS: NOT AT ALL
GAD7 TOTAL SCORE: 0
4. TROUBLE RELAXING: NOT AT ALL

## 2018-08-23 ASSESSMENT — PATIENT HEALTH QUESTIONNAIRE - PHQ9
10. IF YOU CHECKED OFF ANY PROBLEMS, HOW DIFFICULT HAVE THESE PROBLEMS MADE IT FOR YOU TO DO YOUR WORK, TAKE CARE OF THINGS AT HOME, OR GET ALONG WITH OTHER PEOPLE: NOT DIFFICULT AT ALL
SUM OF ALL RESPONSES TO PHQ QUESTIONS 1-9: 0
SUM OF ALL RESPONSES TO PHQ QUESTIONS 1-9: 0

## 2018-08-23 NOTE — PATIENT INSTRUCTIONS
No-Scalpel Vasectomy  Talk to your healthcare provider about how to prepare for the procedure, and about possible risks and complications.      Front view of penis and scrotum showing vas deferens during and after vasectomy.     The no-scalpel procedure is similar to a traditional vasectomy in many respects, but it s done without incisions or stitches. This generally results in faster healing.  During the procedure    You re asked to undress and lie on the exam table. Sterile drapes are placed over you to help prevent infection.    You re given injections of anesthetic into your scrotum or lower groin to prevent you from feeling pain.    Once the anesthetic takes effect, the doctor makes one puncture in the scrotum with a pointed clamp. One at a time, the vasa deferentia are lifted through this puncture.    The vasa are cut, and a section of each may be removed. You may feel a pulling sensation during this process.    The 2 cut ends are sealed by heat (cauterized) and may also be tied or clipped. The puncture heals naturally without stitches.  After the procedure  If you ve been given medicine to help you relax, you ll need to have someone drive you home. The local anesthetic starts to wear off after an hour or so. Any discomfort you feel is usually very mild. If you need it, a pain reliever may help.  During your healing  Recovery time after a no-scalpel vasectomy is usually less than after a traditional vasectomy. Once you re home, you can do several things to aid your recovery:    Stay off your feet as much as possible for the first day to lessen the chance of swelling. An ice pack can also help keep swelling down.    Wear an athletic support or snug cotton briefs for support.    Avoid heavy lifting or exercise for at least 5 days.    Ask your doctor when you can return to work.    Ask your doctor when you can start having sex again. Note: You must use some form of birth control until your doctor says you re  sterile.   Date Last Reviewed: 1/1/2017 2000-2017 [employer], Consumer [employer] (CAP). [address] [place] [address], [place] [address]. All rights reserved. This information is not intended as a substitute for professional medical care. Always follow your healthcare professional's instructions.
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
## 2018-08-23 NOTE — MR AVS SNAPSHOT
After Visit Summary   8/23/2018    Denny Jackson    MRN: [id number]           Patient Information     Date Of Birth          1975        Visit Information        Provider Department      8/23/2018 11:15 AM Myke Quach MD [facility] Instructions      No-Scalpel Vasectomy  Talk to your healthcare provider about how to prepare for the procedure, and about possible risks and complications.      Front view of penis and scrotum showing vas deferens during and after vasectomy.     The no-scalpel procedure is similar to a traditional vasectomy in many respects, but it s done without incisions or stitches. This generally results in faster healing.  During the procedure    You re asked to undress and lie on the exam table. Sterile drapes are placed over you to help prevent infection.    You re given injections of anesthetic into your scrotum or lower groin to prevent you from feeling pain.    Once the anesthetic takes effect, the doctor makes one puncture in the scrotum with a pointed clamp. One at a time, the vasa deferentia are lifted through this puncture.    The vasa are cut, and a section of each may be removed. You may feel a pulling sensation during this process.    The 2 cut ends are sealed by heat (cauterized) and may also be tied or clipped. The puncture heals naturally without stitches.  After the procedure  If you ve been given medicine to help you relax, you ll need to have someone drive you home. The local anesthetic starts to wear off after an hour or so. Any discomfort you feel is usually very mild. If you need it, a pain reliever may help.  During your healing  Recovery time after a no-scalpel vasectomy is usually less than after a traditional vasectomy. Once you re home, you can do several things to aid your recovery:    Stay off your feet as much as possible for the first day to lessen the chance of swelling. An ice pack can also help keep swelling  "down.    Wear an athletic support or snug cotton briefs for support.    Avoid heavy lifting or exercise for at least 5 days.    Ask your doctor when you can return to work.    Ask your doctor when you can start having sex again. Note: You must use some form of birth control until your doctor says you re sterile.   Date Last Reviewed: 1/1/2017 2000-2017 [employer]. [address] [place] [address]. All rights reserved. This information is not intended as a substitute for professional medical care. Always follow your healthcare professional's instructions.                Follow-ups after your visit        Who to contact     If you have questions or need follow up information about today's clinic visit or your schedule please contact [facility] directly at [phone].  Normal or non-critical lab and imaging results will be communicated to you by Tysdohart, letter or phone within 4 business days after the clinic has received the results. If you do not hear from us within 7 days, please contact the clinic through Tysdohart or phone. If you have a critical or abnormal lab result, we will notify you by phone as soon as possible.  Submit refill requests through [employer]Jackpot or call your pharmacy and they will forward the refill request to us. Please allow 3 business days for your refill to be completed.          Additional Information About Your Visit        ShopAdvisort Information     [employer]Jackpot lets you send messages to your doctor, view your test results, renew your prescriptions, schedule appointments and more. To sign up, go to [email or web address] . Click on \"Log in\" on the left side of the screen, which will take you to the Welcome page. Then click on \"Sign up Now\" on the right side of the page.     You will be asked to enter the access code listed below, as well as some personal information. Please follow the directions to create your username and password.     Your access code " "is: [email or web address]  Expires: 2018  2:15 PM     Your access code will  in 90 days. If you need help or a new code, please call your [facility] clinic or [phone].        Care EveryWhere ID     This is your Care EveryWhere ID. This could be used by other organizations to access your [facility] medical records  [id number]        Your Vitals Were     Pulse Temperature Respirations Height BMI (Body Mass Index)       66 98  F (36.7  C) (Oral) 16 5' 10\" (1.778 m) 26.26 kg/m2        Blood Pressure from Last 3 Encounters:   18 133/72   18 120/80   18 130/80    Weight from Last 3 Encounters:   18 183 lb (83 kg)   18 180 lb (81.6 kg)   18 187 lb (84.8 kg)              Today, you had the following     No orders found for display       Primary Care Provider Office Phone # Fax #    Shante Bonilla DO [place] [phone] [address] [facility] [address]        Equal Access to Services     [facility] AH: Hadii aad ku hadasho Soomaali, waaxda luqadaha, qaybta kaalmada adeegyada, uli bolanos hayveronica bridges. So [facility] [phone].    ATENCIÓN: Si habla español, tiene a miller disposición servicios gratuitos de asistencia lingüística. Llame al [phone].    We comply with applicable federal civil rights laws and [place] laws. We do not discriminate on the basis of race, color, national origin, age, disability, sex, sexual orientation, or gender identity.            Thank you!     Thank you for choosing [facility]  for your care. Our goal is always to provide you with excellent care. Hearing back from our patients is one way we can continue to improve our services. Please take a few minutes to complete the written survey that you may receive in the mail after your visit with us. Thank you!             Your Updated Medication List - Protect others around you: Learn how to safely use, store and throw away your medicines at " www.disposemymeds.org.          This list is accurate as of 8/23/18 11:47 AM.  Always use your most recent med list.                   Brand Name Dispense Instructions for use Diagnosis    FLUoxetine 10 MG capsule    PROzac    90 capsule    Take 1 capsule (10 mg) by mouth daily    Adjustment disorder with depressed mood

## 2018-08-24 ASSESSMENT — ANXIETY QUESTIONNAIRES: GAD7 TOTAL SCORE: 0

## 2018-08-24 ASSESSMENT — PATIENT HEALTH QUESTIONNAIRE - PHQ9: SUM OF ALL RESPONSES TO PHQ QUESTIONS 1-9: 0

## 2018-08-28 ENCOUNTER — OFFICE VISIT (OUTPATIENT)
Dept: PODIATRY | Facility: CLINIC | Age: 43
End: 2018-08-28
Payer: COMMERCIAL

## 2018-08-28 ENCOUNTER — RADIANT APPOINTMENT (OUTPATIENT)
Dept: GENERAL RADIOLOGY | Facility: CLINIC | Age: 43
End: 2018-08-28
Attending: PODIATRIST
Payer: COMMERCIAL

## 2018-08-28 VITALS
BODY MASS INDEX: 26.2 KG/M2 | WEIGHT: 183 LBS | SYSTOLIC BLOOD PRESSURE: 110 MMHG | HEIGHT: 70 IN | DIASTOLIC BLOOD PRESSURE: 62 MMHG

## 2018-08-28 DIAGNOSIS — Q66.70 PES CAVUS: ICD-10-CM

## 2018-08-28 DIAGNOSIS — M79.671 RIGHT FOOT PAIN: Primary | ICD-10-CM

## 2018-08-28 DIAGNOSIS — M79.671 RIGHT FOOT PAIN: ICD-10-CM

## 2018-08-28 DIAGNOSIS — M72.2 PLANTAR FASCIITIS, RIGHT: ICD-10-CM

## 2018-08-28 PROCEDURE — 99203 OFFICE O/P NEW LOW 30 MIN: CPT | Performed by: PODIATRIST

## 2018-08-28 PROCEDURE — 73630 X-RAY EXAM OF FOOT: CPT | Mod: RT

## 2018-08-28 ASSESSMENT — ANXIETY QUESTIONNAIRES
GAD7 TOTAL SCORE: 0
2. NOT BEING ABLE TO STOP OR CONTROL WORRYING: NOT AT ALL
3. WORRYING TOO MUCH ABOUT DIFFERENT THINGS: NOT AT ALL
1. FEELING NERVOUS, ANXIOUS, OR ON EDGE: NOT AT ALL
7. FEELING AFRAID AS IF SOMETHING AWFUL MIGHT HAPPEN: NOT AT ALL
5. BEING SO RESTLESS THAT IT IS HARD TO SIT STILL: NOT AT ALL
6. BECOMING EASILY ANNOYED OR IRRITABLE: NOT AT ALL

## 2018-08-28 ASSESSMENT — PATIENT HEALTH QUESTIONNAIRE - PHQ9: 5. POOR APPETITE OR OVEREATING: NOT AT ALL
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
## 2018-08-28 NOTE — MR AVS SNAPSHOT
After Visit Summary   8/28/2018    Denny Jackson    MRN: [id number]           Patient Information     Date Of Birth          1975        Visit Information        Provider Department      8/28/2018 10:30 AM Zonia Macias DPM, Podiatry/Foot and Ankle Surgery [facility]        Today's Diagnoses     Right foot pain    -  1    Pes cavus        Plantar fasciitis, right          Care Instructions    Thank you for choosing [facility]!    DR. MACIAS'S CLINIC SCHEDULE  MONDAY AM - [place] TUESDAY - [place]   [address] [address] [place] [address] [place] [address]   [phone] / [phone] / [phone]       WEDNESDAY - [place] FRIDAY AM - [facility]   [address]   [place] [address] [place] [address]   [phone] / [phone]       FRIDAY PM - [place] SCHEDULE SURGERY: [phone]   [address] BILLING QUESTIONS: [phone]   [place] [address] AFTER HOURS: [phone]   [phone] / [phone] APPOINTMENTS: [phone]     Consumer Price Line (CPL) [phone]       PLANTAR FASCIITIS  Plantar fasciitis is often referred to as heel spurs or heel pain. Plantar fasciitis is a very common problem that affects people of all foot shapes, age, weight and activity level. Pain may be in the arch or on the weight-bearing surface of the heel. The pain may come on without injury or identifiable cause. Pain is generally present when first getting out of bed in the morning or up from a seated break.     CAUSES  The plantar fascia is a dense fibrous band of tissue that stretches across the bottom surface of the foot. The fascia helps support the foot muscles and arch. Plantar fasciitis is thought to be caused by mechanical strain or overload. Frequent walking without shoes or wearing unsupportive shoes is thought to cause structural  overload and ultimately inflammation of the plantar fascia. Some people have heel spurs that can be seen on x-ray. The heel spur is actually a minor component of plantar fascitis and is largely ignored.       SELF TREATMENT   The easiest solution is to stop walking around your home without shoes. Plantar fasciitis is largely a shoe problem. Shoes are either not being worn often enough or your current shoes are inadequate for your weight, foot structure or activity level. The majority of shoes on the market today are not sufficient to resist development of plantar fasciitis or to promote healing. Assume that your current shoes are inadequate and will need to be replaced. Even high quality shoes wear out with 6 months to one year of frequent use. Weight loss is another option. Losing ten pounds in the next two months may be enough to resolve the problem. Ice applied to the area of pain two to three times per day for ten minutes each session can be very helpful. Warm foot soaks in epsom salts can also relieve pain. This should continue until the problem resolves. Achilles tendon stretching is essential. Stretch multiple times daily to promote healing and to prevent recurrence in the future. Over all stretching of the body is helpful as well such as the calves, thighs and lower back. Normally when one area of the body is tight, other areas are too. Gentle Yoga can be good for this.     Over the counter topical anti inflammatories can be helpful such as biofreeze, bengay, salon pas, ect...  Oral ibuprofen or aleve is recommended as well to try to calm down inflammation.     Night splints can be helpful to gradually stretch the foot at night as a lot of pain is when you get up in the morning. Taking a towel or thera band and stretching the foot back multiple times before you get ou of bed can be beneficial as well.     MEDICAL TREATMENT  Medical treatments often include custom arch supports, cortisone injections, physical  therapy, splints to be worn in bed, prescription medications and surgery. The home treatments listed above will be necessary regardless of these advanced medical treatments. Surgery is rarely needed but is very helpful in selected cases.     PROGNOSIS  Plantar fasciitis can last from one day to a lifetime. Some people get intermittent fascitis that is very short-lived. Others suffer daily for years. Excessive body weight, frequent bare foot walking, long hours on the feet, inadequate shoes, predisposing foot structures and excessive activity such as running are all potential issues that lead to chronic and/or recurring plantar fascitis. Having plantar fasciitis means that you are forever prone to this problem and will require modification of some of the above factors. Most people seek treatment within one to four months. Healing usually requires a similar one to four month time frame. Healing time is relative to the amount of effort spent treating the problem.   Plantar fasciitis is highly recurrent. Risk factors often continue, including return to bare foot walking, inadequate shoes, excessive body weight, excessive activities, etc. Your life style and foot structure may predispose you to recurrent plantar fasciitis. A daily prevention regimen can be very helpful. Ongoing use of shoe inserts, careful attention to appropriate shoes, daily Achilles stretching, etc. may prevent recurrence. Prompt attention at the earliest warning signs of heel pain can resolve the problem in as short as a few days.     EXERCISES  Stair Exercise: Step on the stairs with the ball of your foot and hold your position for at least 15 seconds, then slowly step down with the heels of your foot. You can do this daily and as often as you want.   Picking the Towel: Sit comfortably and then pick the towel up with your toes. You can use any object other than a towel as long as the material can be soft and you can pick it up with your  toes.  Rolling the Bottle: Use a small ball or frozen water bottle and then roll it around with your foot.   Flex the Toes: Sit comfortably and then flex your toes by pointing it towards the floor or towards your body. This will relax and flex your foot and exercise your plantar fascia, the calf, and the Achilles tendon. The inability of the foot to stretch often causes the bunching up of the plantar fascia area leading to the pain.  Calf/Achilles Stretching: Lay on you back and raise one foot, then point your toes towards the floor. See photo below:               Hold each stretch for 10 seconds. Stretch 10 times per set, three sets per day. Morning, afternoon and evening. If your heel pain is very severe in the morning, consider doing the first set of stretches before you get out of bed.      OVER THE COUNTER INSERT RECOMMENDATIONS  SuperFeet   Sofsole Fit Spenco   Power Step   Walk-Fit Arch Cradles     Most of these can be found at your local [employer]oes, sporting [employer] stores, or online.  **A good high quality over the counter insert should cost around $40-$50      [employer] [address]  [phone]   [address]  [phone] [place]  [address] [place], [place] [address]way  [phone]   [address]  [phone]   [place]  [address]  [phone] [place] [address]  [phone]   [place]  [address] [place], [place] [address]or vd  [phone]   [place]  [address]  [phone] [place]  [address]  [phone]       ACHILLES TENDON LENGTHENING  The Achilles tendon and calf muscles play a critical role in normal foot and ankle function. Tightness of the muscle and tendon complex prohibits the normal amount of ankle flexibility. Inadequate ankle dorsiflexion, also known as equinus, results in compensatory stress throughout the leg and foot.     Unnatural stress on the foot results in arch collapse, foot  pronation, plantar fasciitis, pain in the ball of the foot, hammer toes, bunions, arthritis, foot wounds, etc. The knee, hip, and leg muscles may also be affected by ankle stiffness. People with diabetes have additional problems with tightness of the Achilles tendon. The combination of foot numbness and a tight Achilles may lead to pressure sores in the ball of the foot. Bone breakdown through the midfoot may also occur as a complication of equinus.     Initial treatment might involve stretching exercises. Stretching will keep the muscles loose but you will not likely accomplish making the tendon longer. Daily Achilles stretching is important   nonetheless. Surgical lengthening of the tendon is often required. .   Surgery can be performed in several ways. The tendon can be lengthened at the ankle level (Achilles lengthening) or in the mid calf (Gastroc lengthening). Achilles and gastrocnemius lengthening can be performed as an isolated procedure or in combination with surgical procedures for other conditions.   Lengthening procedures are most commonly performed for treatment of flatfoot deformity, contracture associated with diabetes related forefoot and midfoot ulcers, contracture after injury or stroke and to treat Achilles tendonitis.   The goal of surgery is to make the tendon longer yet still preserve function of the calf muscles. Most surgical patients do not have noticeable weakness once they recover from the surgery.     The main potential hazard of surgery involves tendon rupture during the healing process. Rupture is not common but could potentially occur. The tendon can also become excessively lengthened, especially from the stress of premature walking before the tendon is healed. Tendon adhesion, skin adhesion and nerve irritation or numbness can also occur.    CALF AND ACHILLES TENDON STRECHES   Stretch gently, do not bounce.   Do each set of stretches three times a day while you are having symptoms.    Do each set of stretches once a day after symptoms are relieved.     1. Towel Stretch     Sit on hard surface with one leg straight out in front of you.     Loop a towel around the ball of the foot and pull the towel to your body.     Be sure to keep your leg straight.     You should feel tension in the calf muscle of the straight leg.     Repeat 3 times on each side for at least 15 seconds each.     2. Standing Calf Stretch     Stand about a foot from a wall and extend one leg behind you.     Keep both feet flat on the floor, toes pointed straight ahead, and the rear knee  straight.     Lean into the wall until you feel tension in the rear leg.     Hold for at least 15 seconds.     Repeat 2 times on each side.     3. Standing Achilles Tendon Stretch     Get into position of Standing Calf Stretch.     Lower the hips downward as you slightly bend the knee of the rear leg.     Keep both feet flat on the floor and toes straight ahead.     Hold for at least 15 seconds.     Repeat 2 times on each side.     Home Medical Equipment:  1. [facility]    [facility] -[address]  [address]   [place] [phone]     2. [facility] - [address], [phone]        Body Mass Index (BMI)  Many things can cause foot and ankle problems. Foot structure, activity level, foot mechanics and injuries are common causes of pain.  One very important issue that often goes unmentioned, is body weight. Extra weight can cause increased stress on muscles, ligaments, bones and tendons.  Sometimes just a few extra pounds is all it takes to put one over her/his threshold. Without reducing that stress, it can be difficult to alleviate pain. Some people are uncomfortable addressing this issue, but we feel it is important for you to think about it. As Foot &  Ankle specialists, our job is addressing the lower  "extremity problem and possible causes. Regarding extra body weight, we encourage patients to discuss diet and weight management plans with their primary care doctors. It is this team approach that gives you the best opportunity for pain relief and getting you back on your feet.                  Follow-ups after your visit        Who to contact     If you have questions or need follow up information about today's clinic visit or your schedule please contact [facility] directly at [phone].  Normal or non-critical lab and imaging results will be communicated to you by Seeonichart, letter or phone within 4 business days after the clinic has received the results. If you do not hear from us within 7 days, please contact the clinic through AFARt or phone. If you have a critical or abnormal lab result, we will notify you by phone as soon as possible.  Submit refill requests through [employer] or call your pharmacy and they will forward the refill request to us. Please allow 3 business days for your refill to be completed.          Additional Information About Your Visit        [employer] Information     [employer] lets you send messages to your doctor, view your test results, renew your prescriptions, schedule appointments and more. To sign up, go to [email or web address] . Click on \"Log in\" on the left side of the screen, which will take you to the Welcome page. Then click on \"Sign up Now\" on the right side of the page.     You will be asked to enter the access code listed below, as well as some personal information. Please follow the directions to create your username and password.     Your access code is: [email or web address]  Expires: 2018  2:15 PM     Your access code will  in 90 days. If you need help or a new code, please call your [facility] or [phone].        Care EveryWhere ID     This is your Care EveryWhere ID. This could be used by other organizations to access your [place] medical " "records  [id number]        Your Vitals Were     Height BMI (Body Mass Index)                5' 10\" (1.778 m) 26.26 kg/m2           Blood Pressure from Last 3 Encounters:   08/28/18 110/62   08/23/18 133/72   08/22/18 120/80    Weight from Last 3 Encounters:   08/28/18 183 lb (83 kg)   08/23/18 183 lb (83 kg)   08/22/18 180 lb (81.6 kg)                 Today's Medication Changes          These changes are accurate as of 8/28/18 11:01 AM.  If you have any questions, ask your nurse or doctor.               Start taking these medicines.        Dose/Directions    order for DME   Used for:  Right foot pain, Pes cavus, Plantar fasciitis, right   Started by:  Zonia Macias DPM, Podiatry/Foot and Ankle Surgery        Equipment being ordered:  Night splint   Quantity:  1 Device   Refills:  0            Where to get your medicines      Some of these will need a paper prescription and others can be bought over the counter.  Ask your nurse if you have questions.     Bring a paper prescription for each of these medications     order for DME                Primary Care Provider Office Phone # Fax #    [facility] [phone] [address] [facility] [address]        Equal Access to Services     JONAH JON AH: Cristina lozano hadasho Soomaali, waaxda luqadaha, qaybta kaalmada adeegyada, waxay luis miguel hayveronica bridges. So [facility] [phone].    ATENCIÓN: Si habla español, tiene a miller disposición servicios gratuitos de asistencia lingüística. Llame al [phone].    We comply with applicable federal civil rights laws and [place] laws. We do not discriminate on the basis of race, color, national origin, age, disability, sex, sexual orientation, or gender identity.            Thank you!     Thank you for choosing [facility]  for your care. Our goal is always to provide you with excellent care. Hearing back from our patients is one way we can continue to improve our " services. Please take a few minutes to complete the written survey that you may receive in the mail after your visit with us. Thank you!             Your Updated Medication List - Protect others around you: Learn how to safely use, store and throw away your medicines at www.disposemymeds.org.          This list is accurate as of 8/28/18 11:01 AM.  Always use your most recent med list.                   Brand Name Dispense Instructions for use Diagnosis    FLUoxetine 10 MG capsule    PROzac    90 capsule    Take 1 capsule (10 mg) by mouth daily    Adjustment disorder with depressed mood       order for DME     1 Device    Equipment being ordered:  Night splint    Right foot pain, Pes cavus, Plantar fasciitis, right

## 2018-08-28 NOTE — PATIENT INSTRUCTIONS
Thank you for choosing [facility]!    DR. SALAZAR'S CLINIC SCHEDULE  MONDAY AM - [place] TUESDAY - [place]   [address] [address] [address] [address] [place] [address]   [phone] / [phone] / [phone]       WEDNESDAY - [place] FRIDAY AM - [facility]   [address]   AR[address] Singh [address] [place] [address]   [phone] / [phone]       FRIDAY PM - [place] SCHEDULE SURGERY: [phone]   [address] BILLING QUESTIONS: [phone]   AR[address] Miles [address] AFTER HOURS: [phone]   [phone] / [phone] APPOINTMENTS: [phone]     Consumer Price Line (CPL) [phone]       PLANTAR FASCIITIS  Plantar fasciitis is often referred to as heel spurs or heel pain. Plantar fasciitis is a very common problem that affects people of all foot shapes, age, weight and activity level. Pain may be in the arch or on the weight-bearing surface of the heel. The pain may come on without injury or identifiable cause. Pain is generally present when first getting out of bed in the morning or up from a seated break.     CAUSES  The plantar fascia is a dense fibrous band of tissue that stretches across the bottom surface of the foot. The fascia helps support the foot muscles and arch. Plantar fasciitis is thought to be caused by mechanical strain or overload. Frequent walking without shoes or wearing unsupportive shoes is thought to cause structural overload and ultimately inflammation of the plantar fascia. Some people have heel spurs that can be seen on x-ray. The heel spur is actually a minor component of plantar fascitis and is largely ignored.       SELF TREATMENT   The easiest solution is to stop walking around your home without shoes. Plantar fasciitis is largely a shoe problem. Shoes are either not being worn often enough or your current shoes are inadequate for your weight,  foot structure or activity level. The majority of shoes on the market today are not sufficient to resist development of plantar fasciitis or to promote healing. Assume that your current shoes are inadequate and will need to be replaced. Even high quality shoes wear out with 6 months to one year of frequent use. Weight loss is another option. Losing ten pounds in the next two months may be enough to resolve the problem. Ice applied to the area of pain two to three times per day for ten minutes each session can be very helpful. Warm foot soaks in epsom salts can also relieve pain. This should continue until the problem resolves. Achilles tendon stretching is essential. Stretch multiple times daily to promote healing and to prevent recurrence in the future. Over all stretching of the body is helpful as well such as the calves, thighs and lower back. Normally when one area of the body is tight, other areas are too. Gentle Yoga can be good for this.     Over the counter topical anti inflammatories can be helpful such as biofreeze, bengay, salon pas, ect...  Oral ibuprofen or aleve is recommended as well to try to calm down inflammation.     Night splints can be helpful to gradually stretch the foot at night as a lot of pain is when you get up in the morning. Taking a towel or thera band and stretching the foot back multiple times before you get ou of bed can be beneficial as well.     MEDICAL TREATMENT  Medical treatments often include custom arch supports, cortisone injections, physical therapy, splints to be worn in bed, prescription medications and surgery. The home treatments listed above will be necessary regardless of these advanced medical treatments. Surgery is rarely needed but is very helpful in selected cases.     PROGNOSIS  Plantar fasciitis can last from one day to a lifetime. Some people get intermittent fascitis that is very short-lived. Others suffer daily for years. Excessive body weight, frequent bare  foot walking, long hours on the feet, inadequate shoes, predisposing foot structures and excessive activity such as running are all potential issues that lead to chronic and/or recurring plantar fascitis. Having plantar fasciitis means that you are forever prone to this problem and will require modification of some of the above factors. Most people seek treatment within one to four months. Healing usually requires a similar one to four month time frame. Healing time is relative to the amount of effort spent treating the problem.   Plantar fasciitis is highly recurrent. Risk factors often continue, including return to bare foot walking, inadequate shoes, excessive body weight, excessive activities, etc. Your life style and foot structure may predispose you to recurrent plantar fasciitis. A daily prevention regimen can be very helpful. Ongoing use of shoe inserts, careful attention to appropriate shoes, daily Achilles stretching, etc. may prevent recurrence. Prompt attention at the earliest warning signs of heel pain can resolve the problem in as short as a few days.     EXERCISES  Stair Exercise: Step on the stairs with the ball of your foot and hold your position for at least 15 seconds, then slowly step down with the heels of your foot. You can do this daily and as often as you want.   Picking the Towel: Sit comfortably and then pick the towel up with your toes. You can use any object other than a towel as long as the material can be soft and you can pick it up with your toes.  Rolling the Bottle: Use a small ball or frozen water bottle and then roll it around with your foot.   Flex the Toes: Sit comfortably and then flex your toes by pointing it towards the floor or towards your body. This will relax and flex your foot and exercise your plantar fascia, the calf, and the Achilles tendon. The inability of the foot to stretch often causes the bunching up of the plantar fascia area leading to the pain.  Calf/Achilles  Stretching: Lay on you back and raise one foot, then point your toes towards the floor. See photo below:               Hold each stretch for 10 seconds. Stretch 10 times per set, three sets per day. Morning, afternoon and evening. If your heel pain is very severe in the morning, consider doing the first set of stretches before you get out of bed.      OVER THE COUNTER INSERT RECOMMENDATIONS  SuperFeet   Sofsole Fit Spenco   Power Step   Walk-Fit Arch Cradles     Most of these can be found at your local [employer], [employer], or online.  **A good high quality over the counter insert should cost around $40-$50      [facility] LOCATIONS  [place]  [address] [place] [address]  [phone]   [address]  [phone] [place]  [address] [place] [address]  [phone]   [place]  [address] [facility] [address]  [phone]   [place]  [address] [facility]  [phone] [place] [address]  [phone]   [place]  [address]  [phone]   [place]  [address] [place] [address]  [phone] [place]  [address]  [phone]       ACHILLES TENDON LENGTHENING  The Achilles tendon and calf muscles play a critical role in normal foot and ankle function. Tightness of the muscle and tendon complex prohibits the normal amount of ankle flexibility. Inadequate ankle dorsiflexion, also known as equinus, results in compensatory stress throughout the leg and foot.     Unnatural stress on the foot results in arch collapse, foot pronation, plantar fasciitis, pain in the ball of the foot, hammer toes, bunions, arthritis, foot wounds, etc. The knee, hip, and leg muscles may also be affected by ankle stiffness. People with diabetes have additional problems with tightness of the Achilles tendon. The combination of foot numbness and a tight Achilles may lead to pressure sores in the ball of the foot. Bone breakdown through the midfoot may also occur as a complication of equinus.      Initial treatment might involve stretching exercises. Stretching will keep the muscles loose but you will not likely accomplish making the tendon longer. Daily Achilles stretching is important   nonetheless. Surgical lengthening of the tendon is often required. .   Surgery can be performed in several ways. The tendon can be lengthened at the ankle level (Achilles lengthening) or in the mid calf (Gastroc lengthening). Achilles and gastrocnemius lengthening can be performed as an isolated procedure or in combination with surgical procedures for other conditions.   Lengthening procedures are most commonly performed for treatment of flatfoot deformity, contracture associated with diabetes related forefoot and midfoot ulcers, contracture after injury or stroke and to treat Achilles tendonitis.   The goal of surgery is to make the tendon longer yet still preserve function of the calf muscles. Most surgical patients do not have noticeable weakness once they recover from the surgery.     The main potential hazard of surgery involves tendon rupture during the healing process. Rupture is not common but could potentially occur. The tendon can also become excessively lengthened, especially from the stress of premature walking before the tendon is healed. Tendon adhesion, skin adhesion and nerve irritation or numbness can also occur.    CALF AND ACHILLES TENDON STRECHES   Stretch gently, do not bounce.   Do each set of stretches three times a day while you are having symptoms.   Do each set of stretches once a day after symptoms are relieved.     1. Towel Stretch     Sit on hard surface with one leg straight out in front of you.     Loop a towel around the ball of the foot and pull the towel to your body.     Be sure to keep your leg straight.     You should feel tension in the calf muscle of the straight leg.     Repeat 3 times on each side for at least 15 seconds each.     2. Standing Calf Stretch     Stand about a foot from a  wall and extend one leg behind you.     Keep both feet flat on the floor, toes pointed straight ahead, and the rear knee  straight.     Lean into the wall until you feel tension in the rear leg.     Hold for at least 15 seconds.     Repeat 2 times on each side.     3. Standing Achilles Tendon Stretch     Get into position of Standing Calf Stretch.     Lower the hips downward as you slightly bend the knee of the rear leg.     Keep both feet flat on the floor and toes straight ahead.     Hold for at least 15 seconds.     Repeat 2 times on each side.     Home Medical Equipment:  1. [facility]    [facility] -[address]   [place] [phone]     2. [facility] - [address], [place], [phone]        Body Mass Index (BMI)  Many things can cause foot and ankle problems. Foot structure, activity level, foot mechanics and injuries are common causes of pain.  One very important issue that often goes unmentioned, is body weight. Extra weight can cause increased stress on muscles, ligaments, bones and tendons.  Sometimes just a few extra pounds is all it takes to put one over her/his threshold. Without reducing that stress, it can be difficult to alleviate pain. Some people are uncomfortable addressing this issue, but we feel it is important for you to think about it. As Foot &  Ankle specialists, our job is addressing the lower extremity problem and possible causes. Regarding extra body weight, we encourage patients to discuss diet and weight management plans with their primary care doctors. It is this team approach that gives you the best opportunity for pain relief and getting you back on your feet.
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
## 2018-08-29 ASSESSMENT — ANXIETY QUESTIONNAIRES: GAD7 TOTAL SCORE: 0

## 2018-08-29 ASSESSMENT — PATIENT HEALTH QUESTIONNAIRE - PHQ9: SUM OF ALL RESPONSES TO PHQ QUESTIONS 1-9: 0

## 2018-11-12 ENCOUNTER — ALLIED HEALTH/NURSE VISIT (OUTPATIENT)
Dept: NURSING | Facility: CLINIC | Age: 43
End: 2018-11-12
Payer: COMMERCIAL

## 2018-11-12 DIAGNOSIS — Z23 NEED FOR PROPHYLACTIC VACCINATION AND INOCULATION AGAINST INFLUENZA: Primary | ICD-10-CM

## 2018-11-12 PROCEDURE — 99207 ZZC NO CHARGE NURSE ONLY: CPT

## 2018-11-12 PROCEDURE — 90471 IMMUNIZATION ADMIN: CPT

## 2018-11-12 PROCEDURE — 90686 IIV4 VACC NO PRSV 0.5 ML IM: CPT

## 2018-11-12 NOTE — MR AVS SNAPSHOT
After Visit Summary   11/12/2018    Denny Jackson    MRN: [id number]           Patient Information     Date Of Birth          1975        Visit Information        Provider Department      11/12/2018 6:00 PM RASHMI COREAS/LPN [facility]        Today's Diagnoses     Need for prophylactic vaccination and inoculation against influenza    -  1       Follow-ups after your visit        Your next 10 appointments already scheduled     Nov 23, 2018  8:00 AM CST   [facility]    [address] [place] [id number] [phone]           Bring a current list of meds and any records pertaining to this visit. For Physicals, please bring immunization records and any forms needing to be filled out. Please arrive 10 minutes early to complete paperwork.              Who to contact     If you have questions or need follow up information about today's clinic visit or your schedule please contact [facility] directly at [phone].  Normal or non-critical lab and imaging results will be communicated to you by MyChart, letter or phone within 4 business days after the clinic has received the results. If you do not hear from us within 7 days, please contact the clinic through [employer] or phone. If you have a critical or abnormal lab result, we will notify you by phone as soon as possible.  Submit refill requests through [employer] or call your pharmacy and they will forward the refill request to us. Please allow 3 business days for your refill to be completed.          Additional Information About Your Visit        Care EveryWhere ID     This is your Care EveryWhere ID. This could be used by other organizations to access your [place] medical records  [id number]         Blood Pressure from Last 3 Encounters:   08/28/18 110/62   08/23/18 133/72   08/22/18 120/80    Weight from Last 3  Encounters:   08/28/18 183 lb (83 kg)   08/23/18 183 lb (83 kg)   08/22/18 180 lb (81.6 kg)              We Performed the Following     FLU VACCINE, SPLIT VIRUS, IM (QUADRIVALENT) [23327]- >3 YRS     Vaccine Administration, Initial [84539]        Primary Care Provider Office Phone # Fax #    Shante Branch [phone] [address] [facility] [address]        Equal Access to Services     JONAH JON : Hadii aad ku hadasho Soomaali, waaxda luqadaha, qaybta kaalmada adeegyada, waxay idiin hayveronica massey . So [facility] [phone].    ATENCIÓN: Si habla español, tiene a miller disposición servicios gratuitos de asistencia lingüística. Llame al [phone].    We comply with applicable federal civil rights laws and [place] laws. We do not discriminate on the basis of race, color, national origin, age, disability, sex, sexual orientation, or gender identity.            Thank you!     Thank you for choosing [facility]  for your care. Our goal is always to provide you with excellent care. Hearing back from our patients is one way we can continue to improve our services. Please take a few minutes to complete the written survey that you may receive in the mail after your visit with us. Thank you!             Your Updated Medication List - Protect others around you: Learn how to safely use, store and throw away your medicines at www.disposemymeds.org.          This list is accurate as of 11/12/18  6:23 PM.  Always use your most recent med list.                   Brand Name Dispense Instructions for use Diagnosis    FLUoxetine 10 MG capsule    PROzac    90 capsule    Take 1 capsule (10 mg) by mouth daily    Adjustment disorder with depressed mood       order for DME     1 Device    Equipment being ordered:  Night splint    Right foot pain, Pes cavus, Plantar fasciitis, right

## 2018-11-23 ENCOUNTER — OFFICE VISIT (OUTPATIENT)
Dept: FAMILY MEDICINE | Facility: CLINIC | Age: 43
End: 2018-11-23
Payer: COMMERCIAL

## 2018-11-23 VITALS
DIASTOLIC BLOOD PRESSURE: 72 MMHG | WEIGHT: 194 LBS | BODY MASS INDEX: 27.84 KG/M2 | SYSTOLIC BLOOD PRESSURE: 130 MMHG | RESPIRATION RATE: 16 BRPM | HEART RATE: 71 BPM | TEMPERATURE: 97.6 F

## 2018-11-23 DIAGNOSIS — Z30.2 ENCOUNTER FOR VASECTOMY: Primary | ICD-10-CM

## 2018-11-23 PROCEDURE — 55250 REMOVAL OF SPERM DUCT(S): CPT | Performed by: FAMILY MEDICINE

## 2018-11-23 PROCEDURE — 88302 TISSUE EXAM BY PATHOLOGIST: CPT | Performed by: FAMILY MEDICINE

## 2018-11-23 NOTE — MR AVS SNAPSHOT
After Visit Summary   11/23/2018    Denny Jackson    MRN: [id number]           Patient Information     Date Of Birth          1975        Visit Information        Provider Department      11/23/2018 8:00 AM Myke Quach MD [facility]        Today's Diagnoses     Encounter for vasectomy    -  1       Follow-ups after your visit        Who to contact     If you have questions or need follow up information about today's clinic visit or your schedule please contact [facility] directly at [phone].  Normal or non-critical lab and imaging results will be communicated to you by MyChart, letter or phone within 4 business days after the clinic has received the results. If you do not hear from us within 7 days, please contact the clinic through MyChart or phone. If you have a critical or abnormal lab result, we will notify you by phone as soon as possible.  Submit refill requests through flexReceipts or call your pharmacy and they will forward the refill request to us. Please allow 3 business days for your refill to be completed.          Additional Information About Your Visit        Care EveryWhere ID     This is your Care EveryWhere ID. This could be used by other organizations to access your [facility] medical records  [id number]        Your Vitals Were     Pulse Temperature Respirations BMI (Body Mass Index)          71 97.6  F (36.4  C) (Oral) 16 27.84 kg/m2         Blood Pressure from Last 3 Encounters:   11/23/18 130/72   08/28/18 110/62   08/23/18 133/72    Weight from Last 3 Encounters:   11/23/18 194 lb (88 kg)   08/28/18 183 lb (83 kg)   08/23/18 183 lb (83 kg)              We Performed the Following     Surgical pathology exam     VASECTOMY TRAY     VASECTOMY UNILAT/BILAT W POSTOP SEMEN        Primary Care Provider Office Phone # Fax #    Shante Branch [phone] [id number] [facility] [address]        Equal Access to  Services     [facility]: Hadii srikanth Britt, waelizabethda luqadaha, qaybta kaalmamadonna lima, uli brdiges. So [facility] [phone].    ATENCIÓN: Si habla español, tiene a miller disposición servicios gratuitos de asistencia lingüística. Llame al [phone].    We comply with applicable federal civil rights laws and [place] laws. We do not discriminate on the basis of race, color, national origin, age, disability, sex, sexual orientation, or gender identity.            Thank you!     Thank you for choosing [facility]  for your care. Our goal is always to provide you with excellent care. Hearing back from our patients is one way we can continue to improve our services. Please take a few minutes to complete the written survey that you may receive in the mail after your visit with us. Thank you!             Your Updated Medication List - Protect others around you: Learn how to safely use, store and throw away your medicines at www.disposemymeds.org.          This list is accurate as of 11/23/18  8:46 AM.  Always use your most recent med list.                   Brand Name Dispense Instructions for use Diagnosis    FLUoxetine 10 MG capsule    PROzac    90 capsule    Take 1 capsule (10 mg) by mouth daily    Adjustment disorder with depressed mood       order for DME     1 Device    Equipment being ordered:  Night splint    Right foot pain, Pes cavus, Plantar fasciitis, right

## 2018-11-23 NOTE — PROGRESS NOTES
SUBJECTIVE:   Denny Jackson is a 43 year old male who presents to clinic today for the following health issues:      Pt here for Vasectomy        Problem list and histories reviewed & adjusted, as indicated.  Additional history:         Reviewed and updated as needed this visit by clinical staff  Tobacco  Allergies  Meds       Reviewed and updated as needed this visit by Provider           Here for vasectomy. Previously counseled.  Informed consent reviewed, questions answered, and signed. Time out taken.    After the verification of the absence of allergies, the patient was prepped with betadine and draped in the usual fashion. Attention turned to the left, where the vas was identified and rotated anterior, the skin anesthetized with marcaine with epinephrine, and underlying tissues anesthetized with Marcaine. The scrotal skin was incised sharply, the vas tool used to dissect to the vas, the vas grasped with the vas clamp, and exposed. Overlying tissue was removed, the ends of the exposed vas controlled with suture, and the vas . The testicular lumen was cauterized. An intervening section of vas was removed, and fascia interposed between the two ends after hemostasis was achieved.  Attention was then turned to the opposite side, where a similar procedure with similar findings was undertaken.   . EBL <1 cc.  Pt counseled previously and again about wound care, the need for alternate contaception until he is proven to have had a successful procedure, return to work, activity restriction, ice.  FELIPE CAVAZOS    .

## 2018-11-26 LAB — COPATH REPORT: NORMAL

## 2019-02-22 ENCOUNTER — TELEPHONE (OUTPATIENT)
Dept: FAMILY MEDICINE | Facility: CLINIC | Age: 44
End: 2019-02-22

## 2019-02-26 ENCOUNTER — TELEPHONE (OUTPATIENT)
Dept: FAMILY MEDICINE | Facility: CLINIC | Age: 44
End: 2019-02-26

## 2019-02-26 DIAGNOSIS — Z98.52 HISTORY OF VASECTOMY: Primary | ICD-10-CM

## 2019-02-26 NOTE — TELEPHONE ENCOUNTER
Left message for patient to return phone call to the [facility].      Melissa Escalera M.A.

## 2019-02-26 NOTE — TELEPHONE ENCOUNTER
Patient calling post vasectomy 11/23/18.  Patient received letter stating to recheck if successful.    Patient questioning if he needs to make a collection prior to office appointment or if lab only.  No order placed.    Please advise    Noelle Camejo

## 2019-03-01 ENCOUNTER — OFFICE VISIT (OUTPATIENT)
Dept: FAMILY MEDICINE | Facility: CLINIC | Age: 44
End: 2019-03-01
Payer: COMMERCIAL

## 2019-03-01 VITALS
TEMPERATURE: 98 F | RESPIRATION RATE: 16 BRPM | WEIGHT: 183 LBS | BODY MASS INDEX: 26.26 KG/M2 | SYSTOLIC BLOOD PRESSURE: 140 MMHG | HEART RATE: 75 BPM | DIASTOLIC BLOOD PRESSURE: 80 MMHG | OXYGEN SATURATION: 92 %

## 2019-03-01 DIAGNOSIS — R03.0 ELEVATED BLOOD PRESSURE READING WITHOUT DIAGNOSIS OF HYPERTENSION: Primary | ICD-10-CM

## 2019-03-01 DIAGNOSIS — Z98.52 HISTORY OF VASECTOMY: ICD-10-CM

## 2019-03-01 LAB — SEMEN ANALYSIS P VAS PNL: NORMAL

## 2019-03-01 PROCEDURE — 89321 SEMEN ANAL SPERM DETECTION: CPT | Performed by: FAMILY MEDICINE

## 2019-03-01 PROCEDURE — 99213 OFFICE O/P EST LOW 20 MIN: CPT | Performed by: FAMILY MEDICINE

## 2019-03-01 ASSESSMENT — PATIENT HEALTH QUESTIONNAIRE - PHQ9
SUM OF ALL RESPONSES TO PHQ QUESTIONS 1-9: 0
SUM OF ALL RESPONSES TO PHQ QUESTIONS 1-9: 0
10. IF YOU CHECKED OFF ANY PROBLEMS, HOW DIFFICULT HAVE THESE PROBLEMS MADE IT FOR YOU TO DO YOUR WORK, TAKE CARE OF THINGS AT HOME, OR GET ALONG WITH OTHER PEOPLE: NOT DIFFICULT AT ALL

## 2019-03-01 NOTE — LETTER
March 4, 2019      Denny Jackson  [address] [address] [facility] [place] [address]        Dear ,      You are shooting blanks!   See my nurses and have your blood pressure checked again, when you are not rushed.        Resulted Orders   Semen Analysis Post Vasectomy   Result Value Ref Range    Post Vas Analysis No Sperm Seen NOSPRM^No Sperm Seen       If you have any questions or concerns, please call the clinic at the number listed above.       Sincerely,        Myke Quach MD

## 2019-03-01 NOTE — PROGRESS NOTES
SUBJECTIVE:   Denny Jackson is a 43 year old male who presents to clinic today for the following health issues:      Follow up vasectomy.    Answers for HPI/ROS submitted by the patient on 3/1/2019   If you checked off any problems, how difficult have these problems made it for you to do your work, take care of things at home, or get along with other people?: Not difficult at all  PHQ9 TOTAL SCORE: 0      No complications            Problem list and histories reviewed & adjusted, as indicated.  Additional history: as documented    Past Medical History:   Diagnosis Date     NO ACTIVE PROBLEMS        Past Surgical History:   Procedure Laterality Date     VASECTOMY         Family History   Problem Relation Age of Onset     Diabetes Father      Prostate Cancer Father      Hypertension Father      Diabetes Maternal Grandmother      Diabetes Maternal Grandfather        Social History     Tobacco Use     Smoking status: Never Smoker     Smokeless tobacco: Never Used   Substance Use Topics     Alcohol use: Yes     Comment: maybe if even once a month         Reviewed and updated as needed this visit by clinical staff       Reviewed and updated as needed this visit by Provider         ROS:      This document serves as a record of the services and decisions personally performed and made by Myke Quach MD. It was created on his behalf by Karl Zamudio, a trained medical scribe. The creation of this document is based on the provider's statements to the medical scribe.  Karl Zamudio March 1, 2019 3:53 PM      OBJECTIVE:     /80 (BP Location: Right arm, Patient Position: Chair, Cuff Size: Adult Large)   Pulse 75   Temp 98  F (36.7  C) (Oral)   Resp 16   Wt 83 kg (183 lb)   SpO2 92%   BMI 26.26 kg/m    Body mass index is 26.26 kg/m .      ASSESSMENT / PLAN:  (R03.0) Elevated blood pressure reading without diagnosis of hypertension  (primary encounter diagnosis)  Commen recommend to collect outside blood pressures over  the next month  Plan: *    (Z98.52) History of vasectomy  Comment: His lab is pending  Plan:           Myke Quach MD              The information in this document, created by the medical scribe for me, accurately reflects the services I personally performed and the decisions made by me. I have reviewed and approved this document for accuracy prior to leaving the patient care area.  March 1, 2019 3:53 PM      Myke Quach MD  [facility]\

## 2019-03-01 NOTE — RESULT ENCOUNTER NOTE
You are shooting blanks!  See my nurses and have your blood pressure checked again, when you are not rushed  FELIPE CAVAZOS

## 2019-03-02 ASSESSMENT — PATIENT HEALTH QUESTIONNAIRE - PHQ9: SUM OF ALL RESPONSES TO PHQ QUESTIONS 1-9: 0

## 2019-03-12 DIAGNOSIS — F43.21 ADJUSTMENT DISORDER WITH DEPRESSED MOOD: ICD-10-CM

## 2019-03-12 NOTE — TELEPHONE ENCOUNTER
"Requested Prescriptions   Pending Prescriptions Disp Refills     FLUoxetine (PROZAC) 10 MG capsule [Pharmacy Med Name: FLUOXETINE 10MG CAPSULES]  Last Written Prescription Date:  8/22/2018  Last Fill Quantity: 90 capsule,  # refills: 1   Last office visit: 3/1/2019 with prescribing provider:  Philomena   Future Office Visit:     90 capsule 0     Sig: TAKE ONE CAPSULE BY MOUTH EVERY DAY    SSRIs Protocol Passed - 3/12/2019 12:22 PM       Passed - PHQ-9 score less than 5 in past 6 months    Please review last PHQ-9 score.          Passed - Medication is active on med list       Passed - Patient is age 18 or older       Passed - Recent (6 mo) or future (30 days) visit within the authorizing provider's specialty    Patient had office visit in the last 6 months or has a visit in the next 30 days with authorizing provider or within the authorizing provider's specialty.  See \"Patient Info\" tab in inbasket, or \"Choose Columns\" in Meds & Orders section of the refill encounter.              "

## 2019-03-15 RX ORDER — FLUOXETINE 10 MG/1
CAPSULE ORAL
Qty: 90 CAPSULE | Refills: 1 | Status: SHIPPED | OUTPATIENT
Start: 2019-03-15 | End: 2019-11-22

## 2019-03-15 NOTE — TELEPHONE ENCOUNTER
[facility] Follow-up to PHQ 8/23/2018 8/28/2018 3/1/2019   PHQ-9 9. Suicide Ideation past 2 weeks Not at all Not at all Not at all     Prescription approved per [facility] Refill Protocol, pcp updated  Tatiana Dick RN, BSN

## 2019-06-14 ENCOUNTER — OFFICE VISIT (OUTPATIENT)
Dept: FAMILY MEDICINE | Facility: CLINIC | Age: 44
End: 2019-06-14
Payer: COMMERCIAL

## 2019-06-14 VITALS
BODY MASS INDEX: 26.26 KG/M2 | WEIGHT: 183 LBS | OXYGEN SATURATION: 97 % | TEMPERATURE: 97.9 F | SYSTOLIC BLOOD PRESSURE: 138 MMHG | DIASTOLIC BLOOD PRESSURE: 72 MMHG | HEART RATE: 65 BPM

## 2019-06-14 DIAGNOSIS — R68.83 CHILLS (WITHOUT FEVER): Primary | ICD-10-CM

## 2019-06-14 LAB
BASOPHILS # BLD AUTO: 0 10E9/L (ref 0–0.2)
BASOPHILS NFR BLD AUTO: 0.4 %
DIFFERENTIAL METHOD BLD: NORMAL
EOSINOPHIL # BLD AUTO: 0.2 10E9/L (ref 0–0.7)
EOSINOPHIL NFR BLD AUTO: 2.2 %
ERYTHROCYTE [DISTWIDTH] IN BLOOD BY AUTOMATED COUNT: 13.3 % (ref 10–15)
HCT VFR BLD AUTO: 45.5 % (ref 40–53)
HGB BLD-MCNC: 15.6 G/DL (ref 13.3–17.7)
LYMPHOCYTES # BLD AUTO: 1.7 10E9/L (ref 0.8–5.3)
LYMPHOCYTES NFR BLD AUTO: 23.9 %
MCH RBC QN AUTO: 29.5 PG (ref 26.5–33)
MCHC RBC AUTO-ENTMCNC: 34.3 G/DL (ref 31.5–36.5)
MCV RBC AUTO: 86 FL (ref 78–100)
MONOCYTES # BLD AUTO: 0.6 10E9/L (ref 0–1.3)
MONOCYTES NFR BLD AUTO: 7.6 %
NEUTROPHILS # BLD AUTO: 4.7 10E9/L (ref 1.6–8.3)
NEUTROPHILS NFR BLD AUTO: 65.9 %
PLATELET # BLD AUTO: 156 10E9/L (ref 150–450)
RBC # BLD AUTO: 5.28 10E12/L (ref 4.4–5.9)
WBC # BLD AUTO: 7.2 10E9/L (ref 4–11)

## 2019-06-14 PROCEDURE — 36415 COLL VENOUS BLD VENIPUNCTURE: CPT | Performed by: PHYSICIAN ASSISTANT

## 2019-06-14 PROCEDURE — 99214 OFFICE O/P EST MOD 30 MIN: CPT | Performed by: PHYSICIAN ASSISTANT

## 2019-06-14 PROCEDURE — 84443 ASSAY THYROID STIM HORMONE: CPT | Performed by: PHYSICIAN ASSISTANT

## 2019-06-14 PROCEDURE — 85025 COMPLETE CBC W/AUTO DIFF WBC: CPT | Performed by: PHYSICIAN ASSISTANT

## 2019-06-14 NOTE — PATIENT INSTRUCTIONS
White blood cell count is normal. I will send thyroid test results to your MyChart.     Use warm compresses on lymph nodes of neck.     If not improving in 1 week, follow-up, or sooner if worsening or fever develops.

## 2019-06-14 NOTE — PROGRESS NOTES
Subjective     Denny Jackson is a 43 year old male who presents to clinic today for the following health issues:    HPI   Acute Illness   Acute illness concerns: Chills and sweats  Onset: 6 days- better last night     Fever: no     Chills/Sweats:YES- mostly at night, sweats during the night    Headache (location?): no     Sinus Pressure:no    Conjunctivitis:  no    Ear Pain: no    Rhinorrhea: no    Congestion: no    Sore Throat: no    Has had similar problems in the past but usually don't last as long.    Cough: yes     Wheeze: no    Decreased Appetite: no     Nausea: YES    Vomiting: no    Diarrhea:  YES- 1 day     Dysuria/Freq.: no    Fatigue/Achiness: no     Sick/Strep Exposure: no      Therapies Tried and outcome: hot showers or wearing warmer clothes      Patient Active Problem List   Diagnosis     CARDIOVASCULAR SCREENING; LDL GOAL LESS THAN 160     Plantar warts     Past Surgical History:   Procedure Laterality Date     VASECTOMY         Social History     Tobacco Use     Smoking status: Never Smoker     Smokeless tobacco: Never Used   Substance Use Topics     Alcohol use: Yes     Comment: maybe if even once a month     Family History   Problem Relation Age of Onset     Diabetes Father      Prostate Cancer Father      Hypertension Father      Diabetes Maternal Grandmother      Diabetes Maternal Grandfather          Current Outpatient Medications   Medication Sig Dispense Refill     FLUoxetine (PROZAC) 10 MG capsule TAKE ONE CAPSULE BY MOUTH EVERY DAY 90 capsule 1     Allergies   Allergen Reactions     Ibuprofen Hives     Rash/hives on face         Reviewed and updated as needed this visit by Provider         Review of Systems   ROS COMP: Constitutional, HEENT, cardiovascular, pulmonary, gi and gu systems are negative, except as otherwise noted.      Objective    /72 (BP Location: Right arm, Patient Position: Chair, Cuff Size: Adult Regular)   Pulse 65   Temp 97.9  F (36.6  C) (Oral)   Wt 83 kg (183 lb)    SpO2 97%   BMI 26.26 kg/m    Body mass index is 26.26 kg/m .  Physical Exam   GENERAL: healthy, alert and no distress  EYES: Eyes grossly normal to inspection, PERRL and conjunctivae and sclerae normal  HENT: ear canals and TM's normal, nose and mouth without ulcers or lesions  RESP: lungs clear to auscultation - no rales, rhonchi or wheezes  CV: regular rate and rhythm, normal S1 S2, no S3 or S4, no murmur, click or rub, no peripheral edema and peripheral pulses strong  MS: no gross musculoskeletal defects noted, no edema  SKIN: no suspicious lesions or rashes  NEURO: Normal strength and tone, mentation intact and speech normal  PSYCH: mentation appears normal, affect normal/bright  LYMPH: anterior cervical: positive    Diagnostic Test Results:  Results for orders placed or performed in visit on 06/14/19 (from the past 24 hour(s))   CBC with platelets differential   Result Value Ref Range    WBC 7.2 4.0 - 11.0 10e9/L    RBC Count 5.28 4.4 - 5.9 10e12/L    Hemoglobin 15.6 13.3 - 17.7 g/dL    Hematocrit 45.5 40.0 - 53.0 %    MCV 86 78 - 100 fl    MCH 29.5 26.5 - 33.0 pg    MCHC 34.3 31.5 - 36.5 g/dL    RDW 13.3 10.0 - 15.0 %    Platelet Count 156 150 - 450 10e9/L    % Neutrophils 65.9 %    % Lymphocytes 23.9 %    % Monocytes 7.6 %    % Eosinophils 2.2 %    % Basophils 0.4 %    Absolute Neutrophil 4.7 1.6 - 8.3 10e9/L    Absolute Lymphocytes 1.7 0.8 - 5.3 10e9/L    Absolute Monocytes 0.6 0.0 - 1.3 10e9/L    Absolute Eosinophils 0.2 0.0 - 0.7 10e9/L    Absolute Basophils 0.0 0.0 - 0.2 10e9/L    Diff Method Automated Method            Assessment & Plan     (R63.83) Chills (without fever)  (primary encounter diagnosis)    Comment: Unclear cause. Possibly viral illness since lymph nodes are also swollen. Monitor. Also checked thyroid to rule out that as a cause.    Plan: CBC with platelets differential, TSH with free         T4 reflex                 Patient Instructions   White blood cell count is normal. I will send  thyroid test results to your MyChart.     Use warm compresses on lymph nodes of neck.     If not improving in 1 week, follow-up, or sooner if worsening or fever develops.       No follow-ups on file.    Kenny Ibanez PA-C  [facility]

## 2019-06-15 LAB — TSH SERPL DL<=0.005 MIU/L-ACNC: 0.59 MU/L (ref 0.4–4)

## 2019-11-07 ENCOUNTER — ALLIED HEALTH/NURSE VISIT (OUTPATIENT)
Dept: FAMILY MEDICINE | Facility: CLINIC | Age: 44
End: 2019-11-07
Payer: COMMERCIAL

## 2019-11-07 DIAGNOSIS — Z23 NEED FOR PROPHYLACTIC VACCINATION AND INOCULATION AGAINST INFLUENZA: Primary | ICD-10-CM

## 2019-11-07 PROCEDURE — 90471 IMMUNIZATION ADMIN: CPT

## 2019-11-07 PROCEDURE — 90686 IIV4 VACC NO PRSV 0.5 ML IM: CPT

## 2019-11-07 PROCEDURE — 99207 ZZC NO CHARGE NURSE ONLY: CPT

## 2019-11-08 ENCOUNTER — HEALTH MAINTENANCE LETTER (OUTPATIENT)
Age: 44
End: 2019-11-08

## 2019-11-22 ENCOUNTER — TELEPHONE (OUTPATIENT)
Dept: FAMILY MEDICINE | Facility: CLINIC | Age: 44
End: 2019-11-22

## 2019-11-22 DIAGNOSIS — F43.21 ADJUSTMENT DISORDER WITH DEPRESSED MOOD: ICD-10-CM

## 2019-11-23 NOTE — TELEPHONE ENCOUNTER
"Requested Prescriptions   Pending Prescriptions Disp Refills     FLUoxetine (PROZAC) 10 MG capsule [Pharmacy Med Name: FLUOXETINE 10MG CAPSULES]  Last Written Prescription Date:  3/15/2019  Last Fill Quantity: 90 cap,  # refills: 1   Last Office Visit: 11/7/2019 [place]  Future Office Visit:      90 capsule 0     Sig: TAKE ONE CAPSULE BY MOUTH EVERY DAY       SSRIs Protocol Failed - 11/23/2019  9:45 AM        Failed - PHQ-9 score less than 5 in past 6 months     Please review last PHQ-9 score.      PHQ-9 SCORE 8/23/2018 8/28/2018 3/1/2019   PHQ-9 Total Score MyChart 0 - 0   PHQ-9 Total Score 0 0 0     RANDALL-7 SCORE 8/23/2018 8/28/2018   Total Score 0 (minimal anxiety) -   Total Score 0 0          Passed - Medication is active on med list        Passed - Patient is age 18 or older        Passed - Recent (6 mo) or future (30 days) visit within the authorizing provider's specialty     Patient had office visit in the last 6 months or has a visit in the next 30 days with authorizing provider or within the authorizing provider's specialty.  See \"Patient Info\" tab in inbasket, or \"Choose Columns\" in Meds & Orders section of the refill encounter.            "

## 2019-11-24 RX ORDER — FLUOXETINE 10 MG/1
CAPSULE ORAL
Qty: 90 CAPSULE | Refills: 0 | Status: SHIPPED | OUTPATIENT
Start: 2019-11-24 | End: 2019-12-27

## 2019-11-24 NOTE — TELEPHONE ENCOUNTER
Medication is being filled for 1 time refill only due to:  Patient needs to be seen because needs annual exam.   Please schedule

## 2019-11-25 NOTE — TELEPHONE ENCOUNTER
TC please call to schedule in person office visit with Ryder (or other provider) for depression ASAP.  This could be annual fasting physical or a depression med check.   Arias Turk RN

## 2019-12-23 NOTE — PROGRESS NOTES
Pre-Visit Planning     Future Appointments   Date Time Provider Department Center   12/27/2019  7:00 AM Sunny Dexter PA-C [facility]     Arrival Time for this Appointment:  6:50 AM   Appointment Notes for this encounter:      Physical  Prozac refill    Questionnaires Reviewed/Assigned  No additional questionnaires are needed      Patient preferred phone number: [phone]    Unable to reach. Left voicemail. Advised patient to call clinic back at [phone]    Anatoly Manley, EMT/Clinic Health Guide

## 2019-12-27 ENCOUNTER — OFFICE VISIT (OUTPATIENT)
Dept: FAMILY MEDICINE | Facility: CLINIC | Age: 44
End: 2019-12-27
Payer: COMMERCIAL

## 2019-12-27 VITALS
WEIGHT: 190 LBS | RESPIRATION RATE: 16 BRPM | TEMPERATURE: 97.8 F | DIASTOLIC BLOOD PRESSURE: 76 MMHG | HEART RATE: 72 BPM | BODY MASS INDEX: 27.2 KG/M2 | SYSTOLIC BLOOD PRESSURE: 117 MMHG | HEIGHT: 70 IN

## 2019-12-27 DIAGNOSIS — Z11.4 ENCOUNTER FOR SCREENING FOR HIV: ICD-10-CM

## 2019-12-27 DIAGNOSIS — F43.21 ADJUSTMENT DISORDER WITH DEPRESSED MOOD: ICD-10-CM

## 2019-12-27 DIAGNOSIS — Z00.00 ENCOUNTER FOR PREVENTIVE HEALTH EXAMINATION: Primary | ICD-10-CM

## 2019-12-27 DIAGNOSIS — K14.8 TONGUE DISCOLORATION: ICD-10-CM

## 2019-12-27 DIAGNOSIS — K40.90 RIGHT INGUINAL HERNIA: ICD-10-CM

## 2019-12-27 LAB
CHOLEST SERPL-MCNC: 222 MG/DL
GLUCOSE SERPL-MCNC: 100 MG/DL (ref 70–99)
HDLC SERPL-MCNC: 46 MG/DL
HIV 1+2 AB+HIV1 P24 AG SERPL QL IA: NONREACTIVE
LDLC SERPL CALC-MCNC: 129 MG/DL
NONHDLC SERPL-MCNC: 176 MG/DL
TRIGL SERPL-MCNC: 237 MG/DL

## 2019-12-27 PROCEDURE — 80061 LIPID PANEL: CPT | Performed by: PHYSICIAN ASSISTANT

## 2019-12-27 PROCEDURE — 36415 COLL VENOUS BLD VENIPUNCTURE: CPT | Performed by: PHYSICIAN ASSISTANT

## 2019-12-27 PROCEDURE — 82947 ASSAY GLUCOSE BLOOD QUANT: CPT | Performed by: PHYSICIAN ASSISTANT

## 2019-12-27 PROCEDURE — 99396 PREV VISIT EST AGE 40-64: CPT | Performed by: PHYSICIAN ASSISTANT

## 2019-12-27 PROCEDURE — 87389 HIV-1 AG W/HIV-1&-2 AB AG IA: CPT | Performed by: PHYSICIAN ASSISTANT

## 2019-12-27 RX ORDER — FLUOXETINE 10 MG/1
10 CAPSULE ORAL DAILY
Qty: 90 CAPSULE | Status: CANCELLED | OUTPATIENT
Start: 2019-12-27

## 2019-12-27 ASSESSMENT — ANXIETY QUESTIONNAIRES
1. FEELING NERVOUS, ANXIOUS, OR ON EDGE: NOT AT ALL
6. BECOMING EASILY ANNOYED OR IRRITABLE: NOT AT ALL
2. NOT BEING ABLE TO STOP OR CONTROL WORRYING: NOT AT ALL
5. BEING SO RESTLESS THAT IT IS HARD TO SIT STILL: NOT AT ALL
GAD7 TOTAL SCORE: 0
3. WORRYING TOO MUCH ABOUT DIFFERENT THINGS: NOT AT ALL
7. FEELING AFRAID AS IF SOMETHING AWFUL MIGHT HAPPEN: NOT AT ALL

## 2019-12-27 ASSESSMENT — MIFFLIN-ST. JEOR: SCORE: 1767.05

## 2019-12-27 ASSESSMENT — PATIENT HEALTH QUESTIONNAIRE - PHQ9
5. POOR APPETITE OR OVEREATING: NOT AT ALL
SUM OF ALL RESPONSES TO PHQ QUESTIONS 1-9: 0

## 2019-12-27 NOTE — PROGRESS NOTES
3  SUBJECTIVE:   CC: Denny Jackson is an 44 year old male who presents for preventive health visit.     Healthy Habits:    Do you get at least three servings of calcium containing foods daily (dairy, green leafy vegetables, etc.)? yes    Amount of exercise or daily activities, outside of work: 1 day(s) per week    Problems taking medications regularly No    Medication side effects: No    Have you had an eye exam in the past two years? yes    Do you see a dentist twice per year? yes    Do you have sleep apnea, excessive snoring or daytime drowsiness?no      Depression Followup    How are you doing with your depression since your last visit? No change    Are you having other symptoms that might be associated with depression? No    Have you had a significant life event?  No     Are you feeling anxious or having panic attacks?   No    Do you have any concerns with your use of alcohol or other drugs? No    Social History     Tobacco Use     Smoking status: Never Smoker     Smokeless tobacco: Never Used   Substance Use Topics     Alcohol use: Yes     Comment: maybe if even once a month     Drug use: No     PHQ 8/23/2018 8/28/2018 3/1/2019   PHQ-9 Total Score 0 0 0   Q9: Thoughts of better off dead/self-harm past 2 weeks Not at all Not at all Not at all     RANDALL-7 SCORE 8/23/2018 8/28/2018   Total Score 0 (minimal anxiety) -   Total Score 0 0     Originally started due to wife with diagnosis of MS. No symptoms for 2 years. No previous depression or anxiety.     Suicide Assessment Five-step Evaluation and Treatment (SAFE-T)      Today's PHQ-2 Score:   PHQ-2 ( 1999 Pfizer) 6/14/2019 2/27/2018   Q1: Little interest or pleasure in doing things 0 0   Q2: Feeling down, depressed or hopeless 0 0   PHQ-2 Score 0 0   Q1: Little interest or pleasure in doing things - Not at all   Q2: Feeling down, depressed or hopeless - Not at all   PHQ-2 Score - 0       Abuse: Current or Past(Physical, Sexual or Emotional)- No  Do you feel safe in  your environment? Yes        Social History     Tobacco Use     Smoking status: Never Smoker     Smokeless tobacco: Never Used   Substance Use Topics     Alcohol use: Yes     Comment: maybe if even once a month     If you drink alcohol do you typically have >3 drinks per day or >7 drinks per week? No                      Last PSA: No results found for: PSA    Reviewed orders with patient. Reviewed health maintenance and updated orders accordingly - Yes  BP Readings from Last 3 Encounters:   12/27/19 117/76   06/14/19 138/72   03/01/19 140/80    Wt Readings from Last 3 Encounters:   12/27/19 86.2 kg (190 lb)   06/14/19 83 kg (183 lb)   03/01/19 83 kg (183 lb)                  Patient Active Problem List   Diagnosis     CARDIOVASCULAR SCREENING; LDL GOAL LESS THAN 160     Plantar warts     Right inguinal hernia     Past Surgical History:   Procedure Laterality Date     VASECTOMY         Social History     Tobacco Use     Smoking status: Never Smoker     Smokeless tobacco: Never Used   Substance Use Topics     Alcohol use: Yes     Comment: maybe if even once a month     Family History   Problem Relation Age of Onset     Diabetes Father      Prostate Cancer Father      Hypertension Father      Diabetes Maternal Grandmother      Diabetes Maternal Grandfather          No current outpatient medications on file.     Allergies   Allergen Reactions     Ibuprofen Hives     Rash/hives on face     Recent Labs   Lab Test 06/14/19  1515 02/27/18  0812 03/24/16  0732   LDL  --  121* 154*   HDL  --  37* 39*   TRIG  --  114 131   TSH 0.59  --   --         Reviewed and updated as needed this visit by clinical staff  Tobacco  Allergies  Meds  Fam Hx         Reviewed and updated as needed this visit by Provider        Past Medical History:   Diagnosis Date     NO ACTIVE PROBLEMS       Past Surgical History:   Procedure Laterality Date     VASECTOMY         ROS:  CONSTITUTIONAL: NEGATIVE for fever, chills, change in  "weight  INTEGUMENTARY/SKIN: NEGATIVE for worrisome rashes, moles or lesions  EYES: NEGATIVE for vision changes or irritation  ENT: NEGATIVE for ear, mouth and throat problems  RESP: NEGATIVE for significant cough or SOB  CV: NEGATIVE for chest pain, palpitations or peripheral edema  GI: NEGATIVE for nausea, abdominal pain, heartburn, or change in bowel habits   male: negative for dysuria, hematuria, decreased urinary stream, erectile dysfunction, urethral discharge  MUSCULOSKELETAL: NEGATIVE for significant arthralgias or myalgia  NEURO: NEGATIVE for weakness, dizziness or paresthesias  PSYCHIATRIC: NEGATIVE for changes in mood or affect    OBJECTIVE:   /76 (BP Location: Right arm, Patient Position: Chair, Cuff Size: Adult Regular)   Pulse 72   Temp 97.8  F (36.6  C) (Oral)   Resp 16   Ht 1.784 m (5' 10.25\")   Wt 86.2 kg (190 lb)   BMI 27.07 kg/m    EXAM:  GENERAL: healthy, alert and no distress  EYES: Eyes grossly normal to inspection, PERRL and conjunctivae and sclerae normal  HENT: white plaque on tongue. Otherwise, TMs occluded with wax, nose and mouth without ulcers or lesions  NECK: no adenopathy, no asymmetry, masses, or scars and thyroid normal to palpation  RESP: lungs clear to auscultation - no rales, rhonchi or wheezes  CV: regular rate and rhythm, normal S1 S2, no S3 or S4, no murmur, click or rub, no peripheral edema and peripheral pulses strong  ABDOMEN: soft, nontender, no hepatosplenomegaly, no masses and bowel sounds normal   (male): testicles normal without atrophy or masses, hernia right inguinal and reducible and penis normal without urethral discharge  MS: no gross musculoskeletal defects noted, no edema  SKIN: no suspicious lesions or rashes  NEURO: Normal strength and tone, mentation intact and speech normal  PSYCH: mentation appears normal, affect normal/bright  LYMPH: no cervical adenopathy    Diagnostic Test Results:  none     ASSESSMENT/PLAN:   (Z00.00) Encounter for " "preventive health examination  (primary encounter diagnosis)  Comment: normal exam other than below  Plan: Lipid panel reflex to direct LDL Fasting,         Glucose            (F43.21) Adjustment disorder with depressed mood  Comment: well controlled for years. Started due to stressor. Likely not needed any more. May stop. If symptoms worsen, will restart. Will update phq 9 in 6 months as well.   Plan:     (K40.90) Right inguinal hernia  Comment: present on exam. No symptoms per patient. Will continue to watch. Will have see general surgery if increasing in size or pain develops.   Plan:     (Z11.4) Encounter for screening for HIV  Comment: discussed cdc recommendations. Patient would like to obtain this.   Plan: HIV Screening            (K14.8) Tongue discoloration  Comment: ongoing. Has tested for thrush in the past and was negative. Significant on exam. No smoking or alcohol use. Will have see ENT for a second opinion.   Plan: OTOLARYNGOLOGY REFERRAL              COUNSELING:  Reviewed preventive health counseling, as reflected in patient instructions       Regular exercise       Healthy diet/nutrition       self testicular exam    Estimated body mass index is 27.07 kg/m  as calculated from the following:    Height as of this encounter: 1.784 m (5' 10.25\").    Weight as of this encounter: 86.2 kg (190 lb).    Weight management plan: Discussed healthy diet and exercise guidelines     reports that he has never smoked. He has never used smokeless tobacco.      Counseling Resources:  ATP IV Guidelines  Pooled Cohorts Equation Calculator  FRAX Risk Assessment  ICSI Preventive Guidelines  Dietary Guidelines for Americans, 2010  USDA's MyPlate  ASA Prophylaxis  Lung CA Screening    Sunny Dexter PA-C  [facility]"

## 2019-12-27 NOTE — PATIENT INSTRUCTIONS
Patient Education     Hernia (Adult)    A hernia can happen when there is a weakness or defect in the wall of the abdomen or groin. Intestines or nearby tissues may move from their usual location and push through the weakness in the wall. This can cause a hernia (bulge) you may see or feel.  Causes and risk factors   A hernia may be present at birth. Or it may be caused by the wear and tear of daily living. Certain factors can make a hernia more likely. These can include:    Heavy lifting    Straining, whether from lifting, movement, or constipation    Chronic cough    Injury to the abdominal wall    Excess weight    Pregnancy    Prior surgery    Older age    Family history of hernia  Symptoms  Symptoms of a hernia may come on suddenly. Or they may appear slowly over time. Some common symptoms include:    Bulge in the groin area, around the navel, or in the scrotum (the bulge may get bigger when you stand and go away when you lie down)    Pain or pressure around the bulge    Pain during activities such as lifting, coughing, or sneezing    A feeling of weakness or pressure in the groin    Pain or swelling in the scrotum  Types of hernias  There are different types of hernia. The type you have depends on its location:    Inguinal. This type is in the groin or scrotum. It is more common in men. But, women can get this hernia, too.    Femoral. This type is in the groin, upper thigh (where the leg bends), or labia. It is more common in women.    Ventral. This type is in the abdominal wall.    Umbilical. This type occurs around the navel (belly button).    Incisional. This type occurs at the site of a previous surgery.  The condition of the hernia can help determine how urgently it needs to be treated.    Reducible. It goes back in by itself, or it can be pushed back in.    Irreducible. It can t be pushed back in.    Incarcerated/strangulated. The intestine is trapped (incarcerated). If this happens, you won t be able  to push the bulge back in. If the incarcerated hernia isn t treated, it may become strangulated. This means the area loses blood supply and the tissue may die. This requires emergency surgery. You need treatment right away.  In most cases, a hernia will not heal on its own.You may need surgery to repair the defect in the abdominal wall or groin. You ll be told more about surgery, if needed.  If your symptoms are not severe, treatment may sometimes be delayed. In such cases, you will need regular follow-up visits with the provider. You ll be asked to keep track of your symptoms and to watch for signs of more serious problems. You may also be given guidelines similar to the home care instructions below.  Home care  To help keep a hernia from getting worse, you may be advised to:    Avoid heavy lifting and straining as directed.    Take steps to prevent constipation, such as eating more fiber and drinking more water. This may help reduce straining that can occur when having a bowel movement. Reducing straining may help keep your symptoms from getting worse.    Maintain a healthy weight or lose excess weight. This can help reduce strain on abdominal muscles and tissues.    Stop smoking. This can help prevent coughing that may also strain abdominal muscles and tissues.  Follow-up care  Follow up with your healthcare provider, or as directed. If imaging tests were done, they will be reviewed a doctor. You will be told the results and any new findings that may affect your care.  When to seek medical advice  Call your healthcare provider right away if any of these occur:    Hernia hardens, swells, or grows larger    Hernia can no longer be pushed back in    Pain moves to the lower right abdomen (just below the waistline), or spreads to the back  Call 911  Call 911 if any of these occur:    Severe pain, redness, or tenderness in the area near the hernia    Pain worsens quickly and doesn t get better    Inability to have a  bowel movement or pass gas    Fever of 100.4 F (38 C) or higher, or as directed by your healthcare provider  Date Last Reviewed: 3/1/2018    0594-4293 [employer]. [address] [place] [address], [place] [address]. All rights reserved. This information is not intended as a substitute for professional medical care. Always follow your healthcare professional's instructions.

## 2019-12-28 ASSESSMENT — ANXIETY QUESTIONNAIRES: GAD7 TOTAL SCORE: 0

## 2020-06-03 ENCOUNTER — ANCILLARY PROCEDURE (OUTPATIENT)
Dept: GENERAL RADIOLOGY | Facility: CLINIC | Age: 45
End: 2020-06-03
Attending: FAMILY MEDICINE
Payer: COMMERCIAL

## 2020-06-03 ENCOUNTER — OFFICE VISIT (OUTPATIENT)
Dept: URGENT CARE | Facility: URGENT CARE | Age: 45
End: 2020-06-03
Payer: COMMERCIAL

## 2020-06-03 VITALS
OXYGEN SATURATION: 95 % | DIASTOLIC BLOOD PRESSURE: 72 MMHG | BODY MASS INDEX: 25.91 KG/M2 | TEMPERATURE: 99.4 F | HEART RATE: 73 BPM | WEIGHT: 181.9 LBS | SYSTOLIC BLOOD PRESSURE: 138 MMHG | RESPIRATION RATE: 16 BRPM

## 2020-06-03 DIAGNOSIS — S60.051A CONTUSION OF RIGHT LITTLE FINGER WITHOUT DAMAGE TO NAIL, INITIAL ENCOUNTER: Primary | ICD-10-CM

## 2020-06-03 DIAGNOSIS — S60.051A CONTUSION OF RIGHT LITTLE FINGER WITHOUT DAMAGE TO NAIL, INITIAL ENCOUNTER: ICD-10-CM

## 2020-06-03 DIAGNOSIS — S62.656A CLOSED NONDISPLACED FRACTURE OF MIDDLE PHALANX OF RIGHT LITTLE FINGER, INITIAL ENCOUNTER: ICD-10-CM

## 2020-06-03 PROCEDURE — 99214 OFFICE O/P EST MOD 30 MIN: CPT | Performed by: FAMILY MEDICINE

## 2020-06-03 PROCEDURE — 73140 X-RAY EXAM OF FINGER(S): CPT | Mod: RT

## 2020-06-03 NOTE — PROGRESS NOTES
SUBJECTIVE:  Chief Complaint   Patient presents with     Urgent Care     Finger     Right pinky finger injury during basketball 06/01/20-Bruised and pain          Denny Jackson is a 44 year old male who presents with a chief complaint of right    5th finger  pain, swelling, tenderness and decreased range of motion.  The injury occurred 2 day(s) ago.   The injury happened while playing basketball. How: finger was hit by the ball  ,immediate pain, immediate swelling, was able to bear weight directly after injury, no deformity was noted by the patient.  The patient complained of moderate pain  and has had decreased ROM.  Pain exacerbated by flexion/extension.  Relieved by ice.  He treated it initially with ice and Ibuprofen. This is the first time this type of injury has occurred to this patient.      Past Medical History:   Diagnosis Date     NO ACTIVE PROBLEMS      Patient Active Problem List   Diagnosis     CARDIOVASCULAR SCREENING; LDL GOAL LESS THAN 160     Plantar warts     Right inguinal hernia       ALLERGIES:  Ibuprofen    No current outpatient medications on file prior to visit.  No current facility-administered medications on file prior to visit.       Social History     Tobacco Use     Smoking status: Never Smoker     Smokeless tobacco: Never Used   Substance Use Topics     Alcohol use: Yes     Comment: maybe if even once a month       Family History   Problem Relation Age of Onset     Diabetes Father      Prostate Cancer Father      Hypertension Father      Diabetes Maternal Grandmother      Diabetes Maternal Grandfather            ROS:  CONSTITUTIONAL:NEGATIVE for fever, chills,    INTEGUMENTARY/SKIN: NEGATIVE for worrisome rashes, or lesions  EYES: NEGATIVE for vision changes or irritation  ENT/MOUTH: NEGATIVE for ear, mouth and throat problems  RESP:NEGATIVE for significant cough or SOB       EXAM:   /72 (BP Location: Right arm, Patient Position: Sitting, Cuff Size: Adult Regular)   Pulse 73    Temp 99.4  F (37.4  C) (Tympanic)   Resp 16   Wt 82.5 kg (181 lb 14.4 oz)   SpO2 95%   BMI 25.91 kg/m    GENERAL: alert, mild distress and cooperative  MUSCULOSKELETAL : right 5th finger(s)  has swelling and point tenderness over PIP joint.   Wound to the skin of the finger noted: - none  Flexion/ extension of DIP joint intact -  - pain free  Flexion/ extension of PIP joint intact - with pain with movement  Flexion/ extension of MCP joint intact- - pain free  Motor and sensory function of the finger  proximal and distal to the injury are intact  VASCULAR: There is not compromise to the distal circulation.  Capillary refill 2 seconds distal to the injury  Pulses are +2         EYES: EOMI,   conjunctiva clear  HENT: External ears with no swelling or lesions   Nose and lips without  Swelling, ulcers, erythema or lesions  NECK: normal pain free ROM  RESP: no labored respirations, no tachypnea  EXTREMITIES:   Full ROM without expression of pain or limitation x 4 extremities  NEURO: Normal strength and tone, ambulation without difficulty,   normal speech and mentation  SKIN: no suspicious lesions or rashes  PSYCH: mentation and affect appears normal and patient appearance--appropriately groomed       X-RAY was done.  Non-displaced fracture was noted middle phalanyx, medial side- proximally.   x-ray read by me Lia Bray MD    ASSESSMENT:   Contusion of right little finger without damage to nail, initial encounter     - XR Finger Right G/E 2 Views; Future    Closed nondisplaced fracture of middle phalanx of right little finger, initial encounter        Acetaminophen/ ibuprofen as alternative for pain  support of the injured finger is provided by fariba taping the finger to the adjacent finger-  Also given alumafoam splint  Return if worsening pain, loss of blood flow to the finger (finger dark and cool), or if redness/ swelling is worsening and spreading proximally  Discussed that sprained fingers usually heal in 1-2  weeks-  Fractures heal in 4-6 weeks  Since the site of injury is not displaced-  No need for ortho follow-up  Follow-up with primary care for ongoing concerns        Referral to orthopedics hand specialty for ongoing management of fracture of the finger.  Discussed the importance of exact alignment needed in healing finger fractures to maintain fine motor control in the fingers.  Splint applied  Discussed that fractures heal in about 6 weeks for adults,  4 weeks for young children.  Note for work/ school

## 2020-06-03 NOTE — PATIENT INSTRUCTIONS
Patient Education     Finger Fracture, Closed  You have a broken finger (fracture). This causes local pain, swelling, and bruising. This injury usually takes about 4 to 6 weeks to heal, but can take longer in some cases. Finger injuries are often treated with a splint or cast, or by taping the injured finger to the next one (fariba taping). This protects the injured finger and holds the bone in position while it heals. More serious fractures may need surgery.     If the fingernail has been severely injured, it will probably fall off in 1 to 2 weeks. A new fingernail will usually start to grow back within a month.  Home care  Follow these guidelines when caring for yourself at home:    Keep your hand elevated to reduce pain and swelling. When sitting or lying down keep your arm above the level of your heart. You can do this by placing your arm on a pillow that rests on your chest or on a pillow at your side. This is most important during the first 2 days (48 hours) after the injury.    Put an ice pack on the injured area. Do this for 20 minutes every 1 to 2 hours the first day for pain relief. You can make an ice pack by wrapping a plastic bag of ice cubes in a thin towel. As the ice melts, be careful that the cast or splint doesn t get wet. Continue using the ice pack 3 to 4 times a day until the pain and swelling go away.    Keep the cast or splint completely dry at all times. Bathe with your cast or splint out of the water. Protect it with a large plastic bag, rubber-banded at the top end. If a fiberglass cast or splint gets wet, you can dry it with a hair dryer.    If fariba tape was put on and it becomes wet or dirty, change it. You may replace it with paper, plastic, or cloth tape. Cloth tape and paper tapes must be kept dry. Keep the fariba tape in place for at least 4 weeks.    You may use acetaminophen or ibuprofen to control pain, unless another pain medicine was prescribed. If you have chronic liver or  kidney disease, talk with your healthcare provider before using these medicines. Also talk with your provider if you ve had a stomach ulcer or gastrointestinal bleeding.    Don t put creams or objects under the cast if you have itching.  Follow-up care  Follow up with your healthcare provider, or as advised. This is to make sure the bone is healing the way it should.  X-rays may be taken. You will be told of any new findings that may affect your care.  When to seek medical advice  Call your healthcare provider right away if any of these occur:    The plaster cast or splint becomes wet or soft    The cast or splint cracks    The fiberglass cast or splint stays wet for more than 24 hours    Pain or swelling gets worse    Redness, warmth, swelling, drainage from the wound, or foul odor from a cast or splint    Finger becomes more cold, blue, numb, or tingly    You can t move your finger    The skin around the cast or splint becomes red    Fever of 100.4 F (38 C) or higher, or as directed by your healthcare provider  Date Last Reviewed: 2/1/2017 2000-2019 [employer]. [address] [place] [id number]. All rights reserved. This information is not intended as a substitute for professional medical care. Always follow your healthcare professional's instructions.

## 2020-09-29 ENCOUNTER — ALLIED HEALTH/NURSE VISIT (OUTPATIENT)
Dept: NURSING | Facility: CLINIC | Age: 45
End: 2020-09-29
Payer: COMMERCIAL

## 2020-09-29 DIAGNOSIS — Z23 NEED FOR PROPHYLACTIC VACCINATION AND INOCULATION AGAINST INFLUENZA: Primary | ICD-10-CM

## 2020-09-29 PROCEDURE — 90471 IMMUNIZATION ADMIN: CPT

## 2020-09-29 PROCEDURE — 90686 IIV4 VACC NO PRSV 0.5 ML IM: CPT

## 2020-09-29 PROCEDURE — 99207 ZZC NO CHARGE NURSE ONLY: CPT

## 2021-02-20 ENCOUNTER — HEALTH MAINTENANCE LETTER (OUTPATIENT)
Age: 46
End: 2021-02-20

## 2021-09-25 ENCOUNTER — HEALTH MAINTENANCE LETTER (OUTPATIENT)
Age: 46
End: 2021-09-25

## 2021-11-29 ENCOUNTER — OFFICE VISIT (OUTPATIENT)
Dept: INTERNAL MEDICINE | Facility: CLINIC | Age: 46
End: 2021-11-29
Payer: COMMERCIAL

## 2021-11-29 VITALS
TEMPERATURE: 98.1 F | BODY MASS INDEX: 27.64 KG/M2 | OXYGEN SATURATION: 95 % | HEART RATE: 74 BPM | DIASTOLIC BLOOD PRESSURE: 80 MMHG | RESPIRATION RATE: 16 BRPM | SYSTOLIC BLOOD PRESSURE: 126 MMHG | WEIGHT: 194 LBS

## 2021-11-29 DIAGNOSIS — J02.9 SORE THROAT: Primary | ICD-10-CM

## 2021-11-29 LAB
DEPRECATED S PYO AG THROAT QL EIA: NEGATIVE
FLUAV AG SPEC QL IA: NEGATIVE
FLUBV AG SPEC QL IA: NEGATIVE
GROUP A STREP BY PCR: NOT DETECTED

## 2021-11-29 PROCEDURE — U0005 INFEC AGEN DETEC AMPLI PROBE: HCPCS | Performed by: INTERNAL MEDICINE

## 2021-11-29 PROCEDURE — 87651 STREP A DNA AMP PROBE: CPT | Performed by: INTERNAL MEDICINE

## 2021-11-29 PROCEDURE — U0003 INFECTIOUS AGENT DETECTION BY NUCLEIC ACID (DNA OR RNA); SEVERE ACUTE RESPIRATORY SYNDROME CORONAVIRUS 2 (SARS-COV-2) (CORONAVIRUS DISEASE [COVID-19]), AMPLIFIED PROBE TECHNIQUE, MAKING USE OF HIGH THROUGHPUT TECHNOLOGIES AS DESCRIBED BY CMS-2020-01-R: HCPCS | Performed by: INTERNAL MEDICINE

## 2021-11-29 PROCEDURE — 87804 INFLUENZA ASSAY W/OPTIC: CPT | Performed by: INTERNAL MEDICINE

## 2021-11-29 PROCEDURE — 99213 OFFICE O/P EST LOW 20 MIN: CPT | Performed by: INTERNAL MEDICINE

## 2021-11-29 NOTE — PROGRESS NOTES
ASSESSMENT:   1. Sore throat  Given exposure to someone 6 days ago who has subsequently been diagnosed with Covid, feel appropriate to test for COVID-19 in addition to strep and influenza.  Labs as ordered.  Patient may use Cepacol lozenges and drink warm liquids for symptom treatment of the throat.  Recommended patient quarantine until results of Covid test are back  - Symptomatic COVID-19 Virus (Coronavirus) by PCR Nasopharyngeal; Future  - Streptococcus A Rapid Screen w/Reflex to PCR - Clinic Collect  - Influenza A & B Antigen - Clinic Collect  - Symptomatic COVID-19 Virus (Coronavirus) by PCR Nasopharyngeal  - Group A Streptococcus PCR Throat Swab      PLAN:  Labs as ordered  May use Cepacol lozenges to numb the throat and drink warm liquids for throat symptoms.  Tylenol as needed  Would quarantine at least until result of Covid test is available.  [place] will make further recommendations based on that result      (Chart documentation was completed, in part, with [employer] voice-recognition software. Even though reviewed, some grammatical, spelling, and word errors may remain.)    Meliton Nava MD  Internal Medicine Department  [facility]      Klaudia Baldwin is a 46 year old who presents for the following health issues     HPI     RESPIRATORY SYMPTOMS      Duration: This morning    Description  sore throat, cough and hoarse voice    Severity: moderate    Accompanying signs and symptoms: None    History (predisposing factors):  Sick exposure, COVID exposure    Precipitating or alleviating factors: None    Therapies tried and outcome:  none        Normally followed by other clinic.  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  He was exposed to one of the coaches of the team 6 days ago and then received a phone call 3 days ago informing him that the  had tested positive for Covid.  Patient began having some fatigue yesterday and awoke with a sore scratchy throat today.  Occasional  "nonproductive cough.  Denies shortness of breath, fevers, chills, sinus pain or other upper respiratory symptoms.  Family members are feeling well.  Patient works from home.  Non-smoker ate supper okay last night.  Has not had breakfast yet this morning    Additional ROS:   Constitutional, HEENT, Cardiovascular, Pulmonary, GI and , Neuro, MSK and Psych review of systems/symptoms are otherwise negative or unchanged from previous, except as noted above.      OBJECTIVE:  /80   Pulse 74   Temp 98.1  F (36.7  C) (Temporal)   Resp 16   Wt 88 kg (194 lb)   SpO2 95%   BMI 27.64 kg/m     Estimated body mass index is 27.64 kg/m  as calculated from the following:    Height as of 12/27/19: 1.784 m (5' 10.25\").    Weight as of this encounter: 88 kg (194 lb).   Gen: Appears nontoxic  HENT: ear canals and TM's normal and nose and mouth without ulcers or lesions.  No oropharyngeal exudate or obvious erythema  Neck: no adenopathy. Thyroid normal to palpation. No bruits  Pulm: Lungs clear to auscultation.  Patient not coughing during appointment today  CV: Regular rates and rhythm  GI: Soft, nontender, Normal active bowel sounds   Ext: Peripheral pulses intact. No edema.                 "

## 2021-11-29 NOTE — PATIENT INSTRUCTIONS
Labs as ordered  May use Cepacol lozenges to numb the throat and drink warm liquids for throat symptoms.  Tylenol as needed  Would quarantine at least until result of Covid test is available.  [place] will make further recommendations based on that result

## 2021-11-30 LAB — SARS-COV-2 RNA RESP QL NAA+PROBE: NEGATIVE

## 2022-01-12 ENCOUNTER — OFFICE VISIT (OUTPATIENT)
Dept: FAMILY MEDICINE | Facility: CLINIC | Age: 47
End: 2022-01-12
Payer: COMMERCIAL

## 2022-01-12 VITALS
OXYGEN SATURATION: 98 % | DIASTOLIC BLOOD PRESSURE: 84 MMHG | WEIGHT: 193.7 LBS | BODY MASS INDEX: 27.73 KG/M2 | TEMPERATURE: 98.1 F | HEIGHT: 70 IN | SYSTOLIC BLOOD PRESSURE: 123 MMHG | RESPIRATION RATE: 18 BRPM | HEART RATE: 68 BPM

## 2022-01-12 DIAGNOSIS — Z11.59 NEED FOR HEPATITIS C SCREENING TEST: ICD-10-CM

## 2022-01-12 DIAGNOSIS — K40.90 RIGHT INGUINAL HERNIA: ICD-10-CM

## 2022-01-12 DIAGNOSIS — Z12.11 SCREENING FOR COLON CANCER: ICD-10-CM

## 2022-01-12 DIAGNOSIS — Z00.00 ROUTINE GENERAL MEDICAL EXAMINATION AT A HEALTH CARE FACILITY: Primary | ICD-10-CM

## 2022-01-12 LAB
CHOLEST SERPL-MCNC: 243 MG/DL
FASTING STATUS PATIENT QL REPORTED: YES
HCV AB SERPL QL IA: NONREACTIVE
HDLC SERPL-MCNC: 48 MG/DL
LDLC SERPL CALC-MCNC: 178 MG/DL
NONHDLC SERPL-MCNC: 195 MG/DL
TRIGL SERPL-MCNC: 87 MG/DL

## 2022-01-12 PROCEDURE — 82947 ASSAY GLUCOSE BLOOD QUANT: CPT | Performed by: PHYSICIAN ASSISTANT

## 2022-01-12 PROCEDURE — 36415 COLL VENOUS BLD VENIPUNCTURE: CPT | Performed by: PHYSICIAN ASSISTANT

## 2022-01-12 PROCEDURE — 99396 PREV VISIT EST AGE 40-64: CPT | Performed by: PHYSICIAN ASSISTANT

## 2022-01-12 PROCEDURE — 86803 HEPATITIS C AB TEST: CPT | Performed by: PHYSICIAN ASSISTANT

## 2022-01-12 PROCEDURE — 80061 LIPID PANEL: CPT | Performed by: PHYSICIAN ASSISTANT

## 2022-01-12 SDOH — ECONOMIC STABILITY: FOOD INSECURITY: WITHIN THE PAST 12 MONTHS, THE FOOD YOU BOUGHT JUST DIDN'T LAST AND YOU DIDN'T HAVE MONEY TO GET MORE.: NEVER TRUE

## 2022-01-12 SDOH — ECONOMIC STABILITY: TRANSPORTATION INSECURITY
IN THE PAST 12 MONTHS, HAS THE LACK OF TRANSPORTATION KEPT YOU FROM MEDICAL APPOINTMENTS OR FROM GETTING MEDICATIONS?: NO

## 2022-01-12 SDOH — ECONOMIC STABILITY: INCOME INSECURITY: HOW HARD IS IT FOR YOU TO PAY FOR THE VERY BASICS LIKE FOOD, HOUSING, MEDICAL CARE, AND HEATING?: NOT HARD AT ALL

## 2022-01-12 SDOH — ECONOMIC STABILITY: TRANSPORTATION INSECURITY
IN THE PAST 12 MONTHS, HAS LACK OF TRANSPORTATION KEPT YOU FROM MEETINGS, WORK, OR FROM GETTING THINGS NEEDED FOR DAILY LIVING?: NO

## 2022-01-12 SDOH — ECONOMIC STABILITY: FOOD INSECURITY: WITHIN THE PAST 12 MONTHS, YOU WORRIED THAT YOUR FOOD WOULD RUN OUT BEFORE YOU GOT MONEY TO BUY MORE.: NEVER TRUE

## 2022-01-12 SDOH — HEALTH STABILITY: PHYSICAL HEALTH: ON AVERAGE, HOW MANY DAYS PER WEEK DO YOU ENGAGE IN MODERATE TO STRENUOUS EXERCISE (LIKE A BRISK WALK)?: 1 DAY

## 2022-01-12 SDOH — ECONOMIC STABILITY: INCOME INSECURITY: IN THE LAST 12 MONTHS, WAS THERE A TIME WHEN YOU WERE NOT ABLE TO PAY THE MORTGAGE OR RENT ON TIME?: NO

## 2022-01-12 SDOH — HEALTH STABILITY: PHYSICAL HEALTH: ON AVERAGE, HOW MANY MINUTES DO YOU ENGAGE IN EXERCISE AT THIS LEVEL?: 20 MIN

## 2022-01-12 ASSESSMENT — ENCOUNTER SYMPTOMS
MYALGIAS: 0
ABDOMINAL PAIN: 0
NERVOUS/ANXIOUS: 0
PARESTHESIAS: 0
CHILLS: 0
NAUSEA: 0
SORE THROAT: 0
CONSTIPATION: 0
HEARTBURN: 0
FEVER: 0
SHORTNESS OF BREATH: 0
HEADACHES: 0
ARTHRALGIAS: 0
DYSURIA: 0
PALPITATIONS: 0
WEAKNESS: 0
EYE PAIN: 0
HEMATURIA: 0
COUGH: 0
DIARRHEA: 0
DIZZINESS: 0
JOINT SWELLING: 0
FREQUENCY: 0
HEMATOCHEZIA: 0

## 2022-01-12 ASSESSMENT — SOCIAL DETERMINANTS OF HEALTH (SDOH)
IN A TYPICAL WEEK, HOW MANY TIMES DO YOU TALK ON THE PHONE WITH FAMILY, FRIENDS, OR NEIGHBORS?: MORE THAN THREE TIMES A WEEK
HOW OFTEN DO YOU GET TOGETHER WITH FRIENDS OR RELATIVES?: ONCE A WEEK
DO YOU BELONG TO ANY CLUBS OR ORGANIZATIONS SUCH AS CHURCH GROUPS UNIONS, FRATERNAL OR ATHLETIC GROUPS, OR SCHOOL GROUPS?: YES
HOW OFTEN DO YOU ATTEND CHURCH OR RELIGIOUS SERVICES?: NEVER

## 2022-01-12 ASSESSMENT — LIFESTYLE VARIABLES
HOW OFTEN DO YOU HAVE A DRINK CONTAINING ALCOHOL: MONTHLY OR LESS
HOW OFTEN DO YOU HAVE SIX OR MORE DRINKS ON ONE OCCASION: LESS THAN MONTHLY
HOW MANY STANDARD DRINKS CONTAINING ALCOHOL DO YOU HAVE ON A TYPICAL DAY: 1 OR 2

## 2022-01-12 ASSESSMENT — MIFFLIN-ST. JEOR: SCORE: 1764.87

## 2022-01-12 NOTE — PROGRESS NOTES
SUBJECTIVE:   CC: Denny Jackson is an 46 year old male who presents for preventative health visit.       Patient has been advised of split billing requirements and indicates understanding: Yes  Healthy Habits:     Getting at least 3 servings of Calcium per day:  Yes    Bi-annual eye exam:  Yes    Dental care twice a year:  Yes    Sleep apnea or symptoms of sleep apnea:  None    Diet:  Regular (no restrictions)    Frequency of exercise:  1 day/week    Duration of exercise:  15-30 minutes    Taking medications regularly:  Yes    Medication side effects:  None    PHQ-2 Total Score: 0    Additional concerns today:  No        Today's PHQ-2 Score:   PHQ-2 ( 1999 Pfizer) 1/12/2022   Q1: Little interest or pleasure in doing things 0   Q2: Feeling down, depressed or hopeless 0   PHQ-2 Score 0   PHQ-2 Total Score (12-17 Years)- Positive if 3 or more points; Administer PHQ-A if positive -   Q1: Little interest or pleasure in doing things Not at all   Q2: Feeling down, depressed or hopeless Not at all   PHQ-2 Score 0       Abuse: Current or Past(Physical, Sexual or Emotional)- No  Do you feel safe in your environment? Yes    Have you ever done Advance Care Planning? (For example, a Health Directive, POLST, or a discussion with a medical provider or your loved ones about your wishes): Yes, patient states has an Advance Care Planning document and will bring a copy to the clinic.    Social History     Tobacco Use     Smoking status: Never Smoker     Smokeless tobacco: Never Used   Substance Use Topics     Alcohol use: Yes     Comment: maybe if even once a month     If you drink alcohol do you typically have >3 drinks per day or >7 drinks per week? No    Alcohol Use 1/12/2022   Prescreen: >3 drinks/day or >7 drinks/week? No   Prescreen: >3 drinks/day or >7 drinks/week? -   No flowsheet data found.    Last PSA: No results found for: PSA    Reviewed orders with patient. Reviewed health maintenance and updated orders accordingly -  Yes  BP Readings from Last 3 Encounters:   01/12/22 123/84   11/29/21 126/80   06/03/20 138/72    Wt Readings from Last 3 Encounters:   01/12/22 87.9 kg (193 lb 11.2 oz)   11/29/21 88 kg (194 lb)   06/03/20 82.5 kg (181 lb 14.4 oz)                  Patient Active Problem List   Diagnosis     CARDIOVASCULAR SCREENING; LDL GOAL LESS THAN 160     Plantar warts     Right inguinal hernia     Past Surgical History:   Procedure Laterality Date     VASECTOMY         Social History     Tobacco Use     Smoking status: Never Smoker     Smokeless tobacco: Never Used   Substance Use Topics     Alcohol use: Yes     Comment: maybe if even once a month     Family History   Problem Relation Age of Onset     Diabetes Father      Prostate Cancer Father      Hypertension Father      Diabetes Maternal Grandmother      Diabetes Maternal Grandfather          No current outpatient medications on file.     Allergies   Allergen Reactions     Ibuprofen Hives     Rash/hives on face     Recent Labs   Lab Test 12/27/19  0752 06/14/19  1515 02/27/18  0812 03/24/16  0732   *  --  121* 154*   HDL 46  --  37* 39*   TRIG 237*  --  114 131   TSH  --  0.59  --   --         Reviewed and updated as needed this visit by clinical staff  Tobacco  Allergies  Meds  Problems  Med Hx  Surg Hx  Fam Hx  Soc Hx         Reviewed and updated as needed this visit by Provider  Tobacco  Allergies  Meds  Problems  Med Hx  Surg Hx  Fam Hx        Past Medical History:   Diagnosis Date     NO ACTIVE PROBLEMS       Past Surgical History:   Procedure Laterality Date     VASECTOMY         Review of Systems   Constitutional: Negative for chills and fever.   HENT: Negative for congestion, ear pain, hearing loss and sore throat.    Eyes: Negative for pain and visual disturbance.   Respiratory: Negative for cough and shortness of breath.    Cardiovascular: Negative for chest pain, palpitations and peripheral edema.   Gastrointestinal: Negative for abdominal  "pain, constipation, diarrhea, heartburn, hematochezia and nausea.   Genitourinary: Negative for dysuria, frequency, genital sores, hematuria, impotence, penile discharge and urgency.   Musculoskeletal: Negative for arthralgias, joint swelling and myalgias.   Skin: Negative for rash.   Neurological: Negative for dizziness, weakness, headaches and paresthesias.   Psychiatric/Behavioral: Negative for mood changes. The patient is not nervous/anxious.        OBJECTIVE:   /84 (BP Location: Right arm, Patient Position: Chair, Cuff Size: Adult Large)   Pulse 68   Temp 98.1  F (36.7  C) (Oral)   Resp 18   Ht 1.77 m (5' 9.69\")   Wt 87.9 kg (193 lb 11.2 oz)   SpO2 98%   BMI 28.04 kg/m      Physical Exam  GENERAL: healthy, alert and no distress  EYES: Eyes grossly normal to inspection, PERRL and conjunctivae and sclerae normal  HENT: ear canals and TM's normal, nose and mouth without ulcers or lesions  NECK: no adenopathy, no asymmetry, masses, or scars and thyroid normal to palpation  RESP: lungs clear to auscultation - no rales, rhonchi or wheezes  CV: regular rate and rhythm, normal S1 S2, no S3 or S4, no murmur, click or rub, no peripheral edema and peripheral pulses strong  ABDOMEN: soft, nontender, no hepatosplenomegaly, no masses and bowel sounds normal   (male): testicles normal without atrophy or masses, hernia right inguinal and penis normal without urethral discharge  MS: no gross musculoskeletal defects noted, no edema  SKIN: no suspicious lesions or rashes  NEURO: Normal strength and tone, mentation intact and speech normal  PSYCH: mentation appears normal, affect normal/bright  LYMPH: no cervical adenopathy        ASSESSMENT/PLAN:   (Z00.00) Routine general medical examination at a health care facility  (primary encounter diagnosis)  Comment: stable exam.   Plan: GLUCOSE, Lipid panel reflex to direct LDL         Fasting            (Z11.59) Need for hepatitis C screening test  Comment:   Plan: " "Hepatitis C Screen Reflex to HCV RNA Quant and         Genotype            (Z12.11) Screening for colon cancer  Comment: due.   Plan: Adult Gastro Ref - Procedure Only            (K40.90) Right inguinal hernia  Comment: ongoing. No symptoms. Referral given in case wants consult for possible fixation. Otherwise, watch and wait can be continued.   Plan: Adult General Surg Referral              Patient has been advised of split billing requirements and indicates understanding:   COUNSELING:   Reviewed preventive health counseling, as reflected in patient instructions       Regular exercise       Healthy diet/nutrition       Consider Hep C screening for all patients one time for ages 18-79 years    Estimated body mass index is 28.04 kg/m  as calculated from the following:    Height as of this encounter: 1.77 m (5' 9.69\").    Weight as of this encounter: 87.9 kg (193 lb 11.2 oz).     Weight management plan: Discussed healthy diet and exercise guidelines    He reports that he has never smoked. He has never used smokeless tobacco.      Counseling Resources:  ATP IV Guidelines  Pooled Cohorts Equation Calculator  FRAX Risk Assessment  ICSI Preventive Guidelines  Dietary Guidelines for Americans, 2010  USDA's MyPlate  ASA Prophylaxis  Lung CA Screening    Sunny Dexter PA-C  [facility]"

## 2022-01-12 NOTE — PROGRESS NOTES
SUBJECTIVE:   CC: Denny Jackson is an 46 year old male who presents for preventative health visit.     Patient has been advised of split billing requirements and indicates understanding: Yes  Healthy Habits:     Getting at least 3 servings of Calcium per day:  Yes    Bi-annual eye exam:  Yes    Dental care twice a year:  Yes    Sleep apnea or symptoms of sleep apnea:  None    Diet:  Regular (no restrictions)    Frequency of exercise:  1 day/week    Duration of exercise:  15-30 minutes    Taking medications regularly:  Yes    Medication side effects:  None    PHQ-2 Total Score: 0    Additional concerns today:  No    Today's PHQ-2 Score:   PHQ-2 ( 1999 Pfizer) 1/12/2022   Q1: Little interest or pleasure in doing things 0   Q2: Feeling down, depressed or hopeless 0   PHQ-2 Score 0   PHQ-2 Total Score (12-17 Years)- Positive if 3 or more points; Administer PHQ-A if positive -   Q1: Little interest or pleasure in doing things Not at all   Q2: Feeling down, depressed or hopeless Not at all   PHQ-2 Score 0       Abuse: Current or Past(Physical, Sexual or Emotional)- No  Do you feel safe in your environment? Yes    Have you ever done Advance Care Planning? (For example, a Health Directive, POLST, or a discussion with a medical provider or your loved ones about your wishes): Yes, patient states has an Advance Care Planning document and will bring a copy to the clinic.    Social History     Tobacco Use     Smoking status: Never Smoker     Smokeless tobacco: Never Used   Substance Use Topics     Alcohol use: Yes     Comment: maybe if even once a month     If you drink alcohol do you typically have >3 drinks per day or >7 drinks per week? No    Alcohol Use 1/12/2022   Prescreen: >3 drinks/day or >7 drinks/week? No   Prescreen: >3 drinks/day or >7 drinks/week? -     Last PSA: No results found for: PSA    Reviewed orders with patient. Reviewed health maintenance and updated orders accordingly - Yes    Reviewed and updated as needed  "this visit by clinical staff  Tobacco  Allergies                    Past Medical History:   Diagnosis Date     NO ACTIVE PROBLEMS       Past Surgical History:   Procedure Laterality Date     VASECTOMY         Review of Systems   Constitutional: Negative for chills and fever.   HENT: Negative for congestion, ear pain, hearing loss and sore throat.    Eyes: Negative for pain and visual disturbance.   Respiratory: Negative for cough and shortness of breath.    Cardiovascular: Negative for chest pain, palpitations and peripheral edema.   Gastrointestinal: Negative for abdominal pain, constipation, diarrhea, heartburn, hematochezia and nausea.   Genitourinary: Negative for dysuria, frequency, genital sores, hematuria, impotence, penile discharge and urgency.   Musculoskeletal: Negative for arthralgias, joint swelling and myalgias.   Skin: Negative for rash.   Neurological: Negative for dizziness, weakness, headaches and paresthesias.   Psychiatric/Behavioral: Negative for mood changes. The patient is not nervous/anxious.      OBJECTIVE:   /84 (BP Location: Right arm, Patient Position: Chair, Cuff Size: Adult Large)   Pulse 68   Temp 98.1  F (36.7  C) (Oral)   Resp 18   Ht 1.77 m (5' 9.69\")   Wt 87.9 kg (193 lb 11.2 oz)   SpO2 98%   BMI 28.04 kg/m      Physical Exam  GENERAL: healthy, alert and no distress  EYES: Eyes grossly normal to inspection, PERRL and conjunctivae and sclerae normal  HENT: ear canals and TM's normal, nose and mouth without ulcers or lesions  NECK: no adenopathy, no asymmetry, masses, or scars and thyroid normal to palpation  RESP: lungs clear to auscultation - no rales, rhonchi or wheezes  CV: regular rate and rhythm, normal S1 S2, no S3 or S4, no murmur, click or rub, no peripheral edema and peripheral pulses strong  ABDOMEN: soft, nontender, no hepatosplenomegaly, no masses and bowel sounds normal   (male): normal male genitalia without lesions or urethral discharge, right " "inguinal hernia present, non-tender.  MS: no gross musculoskeletal defects noted, no edema  SKIN: no suspicious lesions or rashes  NEURO: Normal strength and tone, mentation intact and speech normal  PSYCH: mentation appears normal, affect normal/bright    Diagnostic Test Results:  Labs reviewed in Epic    ASSESSMENT/PLAN:   (Z00.00) Routine general medical examination at a health care facility  (primary encounter diagnosis)  Comment: Exam as above. Right inguinal hernia stable. Remainder of exam normal. No acute concerns today. Will check fasting glucose level and lipid panel  Plan: GLUCOSE, Lipid panel reflex to direct LDL         Fasting    (Z11.59) Need for hepatitis C screening test  Comment:  Plan: Hepatitis C Screen Reflex to HCV RNA Quant and         Genotype          (Z12.11) Screening for colon cancer  Comment: Patient informed of new guidelines to start screening at age 45. He is in agreement with colonoscopy procedure. Order placed and patient to call and set this up.  Plan: Adult Gastro Ref - Procedure Only          (K40.90) Right inguinal hernia  Comment: stable. Have discussed general surgery referral in the past if the patient wishes, has not been symptomatic. He feels he would like to talk to general surgery this year, new referral given.  Plan: Adult General Surg Referral         Patient has been advised of split billing requirements and indicates understanding: Yes    COUNSELING:   Reviewed preventive health counseling, as reflected in patient instructions       Regular exercise       Healthy diet/nutrition       Consider Hep C screening for all patients one time for ages 18-79 years       Colon cancer screening    Estimated body mass index is 27.64 kg/m  as calculated from the following:    Height as of 12/27/19: 1.784 m (5' 10.25\").    Weight as of 11/29/21: 88 kg (194 lb).  Weight management plan: Discussed healthy diet and exercise guidelines    He reports that he has never smoked. He has " never used smokeless tobacco.    Counseling Resources:  ATP IV Guidelines  Pooled Cohorts Equation Calculator  FRAX Risk Assessment  ICSI Preventive Guidelines  Dietary Guidelines for Americans, 2010  USDA's MyPlate  ASA Prophylaxis  Lung CA Screening    CARL Richard [facility]

## 2022-01-13 LAB — GLUCOSE BLD-MCNC: 88 MG/DL (ref 70–99)

## 2022-01-17 ENCOUNTER — OFFICE VISIT (OUTPATIENT)
Dept: SURGERY | Facility: CLINIC | Age: 47
End: 2022-01-17
Payer: COMMERCIAL

## 2022-01-17 VITALS
HEIGHT: 70 IN | OXYGEN SATURATION: 95 % | SYSTOLIC BLOOD PRESSURE: 123 MMHG | WEIGHT: 193 LBS | BODY MASS INDEX: 27.63 KG/M2 | DIASTOLIC BLOOD PRESSURE: 84 MMHG | HEART RATE: 62 BPM | RESPIRATION RATE: 16 BRPM

## 2022-01-17 DIAGNOSIS — K40.90 RIGHT INGUINAL HERNIA: Primary | ICD-10-CM

## 2022-01-17 PROCEDURE — 99202 OFFICE O/P NEW SF 15 MIN: CPT | Performed by: SURGERY

## 2022-01-17 ASSESSMENT — MIFFLIN-ST. JEOR: SCORE: 1766.69

## 2022-01-17 NOTE — LETTER
2022    RE: Denny Jackson, : 1975      Denny is a 46 year old male who presents for evaluation for inguinal hernia. He was told by his PCP in 2019 that he had a right groin hernia. He denies any pain with activity and has not noted a bulge on his own. Pt has not had previous abdominal surgery. 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     Constipation No   Dysuria No  Cough No     Pt's chart has been reviewed for PMH, PSH, allergies, medications, social history and family history.     ROS:  Pulm:  No shortness of breath, dyspnea on exertion, cough, or hemoptysis  CV:  negative  ABD:  See chief complaint  :  Negative  All other systems negative/normal     There were no vitals taken for this visit.  Physical exam:  Patient able to get up on table without difficulty.  Abdomen is abdomen is soft without significant tenderness, masses, organomegaly or guarding  bowel sounds are positive and no caput medusa noted.  Hernia  Left inguinal hernia is not present with valsalva              Right inguinal hernia is present with valsalva; it is subtle and difficult to palpate              The hernia is reducible              Testicles are normal     Assesment: right inguinal hernia, asymptomatic     Plan: The nature of hernias, anatomic considerations, indications and approaches to repair were discussed.  Risks of operative intervention were discussed including infection, bleeding, harm to structures including vessels and nerves as well as recurrence, which is about 5% per decade of life.  Post operative recuperation and activity limitations were reviewed as well.  We discussed watchful waiting for asymptomatic hernias and rates of incarceration. He would like to observe for now as he is having no discomfort from the hernia. He will return to us in the future if he desires operative repair.        Karl Banda MD

## 2022-01-17 NOTE — PROGRESS NOTES
Denny is a 46 year old male who presents for evaluation for inguinal hernia. He was told by his PCP in 2019 that he had a right groin hernia. He denies any pain with activity and has not noted a bulge on his own. Pt has not had previous abdominal surgery. 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    Constipation No   Dysuria No  Cough No    Pt's chart has been reviewed for PMH, PSH, allergies, medications, social history and family history.    ROS:  Pulm:  No shortness of breath, dyspnea on exertion, cough, or hemoptysis  CV:  negative  ABD:  See chief complaint  :  Negative  All other systems negative/normal    There were no vitals taken for this visit.  Physical exam:  Patient able to get up on table without difficulty.  Abdomen is abdomen is soft without significant tenderness, masses, organomegaly or guarding  bowel sounds are positive and no caput medusa noted.  Hernia  Left inguinal hernia is not present with valsalva              Right inguinal hernia is present with valsalva; it is subtle and difficult to palpate              The hernia is reducible              Testicles are normal    Assesment: right inguinal hernia, asymptomatic    Plan: The nature of hernias, anatomic considerations, indications and approaches to repair were discussed.  Risks of operative intervention were discussed including infection, bleeding, harm to structures including vessels and nerves as well as recurrence, which is about 5% per decade of life.  Post operative recuperation and activity limitations were reviewed as well.  We discussed watchful waiting for asymptomatic hernias and rates of incarceration. He would like to observe for now as he is having no discomfort from the hernia. He will return to us in the future if he desires operative repair.      Karl Banda MD  1/17/2022 9:36 AM    Please route or send letter to:  Primary Care Provider (PCP)

## 2022-03-11 ENCOUNTER — TRANSFERRED RECORDS (OUTPATIENT)
Dept: HEALTH INFORMATION MANAGEMENT | Facility: CLINIC | Age: 47
End: 2022-03-11
Payer: COMMERCIAL

## 2023-01-07 ENCOUNTER — HEALTH MAINTENANCE LETTER (OUTPATIENT)
Age: 48
End: 2023-01-07

## 2023-04-22 ENCOUNTER — HEALTH MAINTENANCE LETTER (OUTPATIENT)
Age: 48
End: 2023-04-22

## 2023-09-05 ASSESSMENT — ENCOUNTER SYMPTOMS
NERVOUS/ANXIOUS: 0
PALPITATIONS: 0
DYSURIA: 0
ABDOMINAL PAIN: 0
HEARTBURN: 0
FREQUENCY: 0
CHILLS: 0
COUGH: 0
PARESTHESIAS: 0
EYE PAIN: 0
HEMATURIA: 0
DIZZINESS: 0
CONSTIPATION: 0
WEAKNESS: 0
FEVER: 0
HEMATOCHEZIA: 0
SHORTNESS OF BREATH: 0
MYALGIAS: 0
ARTHRALGIAS: 0
DIARRHEA: 0
SORE THROAT: 0
HEADACHES: 0
JOINT SWELLING: 0
NAUSEA: 0

## 2023-09-08 ENCOUNTER — OFFICE VISIT (OUTPATIENT)
Dept: FAMILY MEDICINE | Facility: CLINIC | Age: 48
End: 2023-09-08
Payer: COMMERCIAL

## 2023-09-08 VITALS
RESPIRATION RATE: 19 BRPM | OXYGEN SATURATION: 96 % | HEIGHT: 70 IN | DIASTOLIC BLOOD PRESSURE: 84 MMHG | HEART RATE: 65 BPM | SYSTOLIC BLOOD PRESSURE: 128 MMHG | WEIGHT: 197.1 LBS | BODY MASS INDEX: 28.22 KG/M2

## 2023-09-08 DIAGNOSIS — Z00.00 ROUTINE GENERAL MEDICAL EXAMINATION AT A HEALTH CARE FACILITY: Primary | ICD-10-CM

## 2023-09-08 DIAGNOSIS — Z13.1 SCREENING FOR DIABETES MELLITUS: ICD-10-CM

## 2023-09-08 DIAGNOSIS — E78.2 MIXED HYPERLIPIDEMIA: ICD-10-CM

## 2023-09-08 DIAGNOSIS — E66.3 OVERWEIGHT (BMI 25.0-29.9): ICD-10-CM

## 2023-09-08 LAB
CHOLEST SERPL-MCNC: 270 MG/DL
FASTING STATUS PATIENT QL REPORTED: YES
GLUCOSE SERPL-MCNC: 89 MG/DL (ref 70–99)
HDLC SERPL-MCNC: 43 MG/DL
LDLC SERPL CALC-MCNC: 208 MG/DL
NONHDLC SERPL-MCNC: 227 MG/DL
TRIGL SERPL-MCNC: 94 MG/DL

## 2023-09-08 PROCEDURE — 90471 IMMUNIZATION ADMIN: CPT | Performed by: PHYSICIAN ASSISTANT

## 2023-09-08 PROCEDURE — 36415 COLL VENOUS BLD VENIPUNCTURE: CPT | Performed by: PHYSICIAN ASSISTANT

## 2023-09-08 PROCEDURE — 90746 HEPB VACCINE 3 DOSE ADULT IM: CPT | Performed by: PHYSICIAN ASSISTANT

## 2023-09-08 PROCEDURE — 82947 ASSAY GLUCOSE BLOOD QUANT: CPT | Performed by: PHYSICIAN ASSISTANT

## 2023-09-08 PROCEDURE — 90686 IIV4 VACC NO PRSV 0.5 ML IM: CPT | Performed by: PHYSICIAN ASSISTANT

## 2023-09-08 PROCEDURE — 90472 IMMUNIZATION ADMIN EACH ADD: CPT | Performed by: PHYSICIAN ASSISTANT

## 2023-09-08 PROCEDURE — 80061 LIPID PANEL: CPT | Performed by: PHYSICIAN ASSISTANT

## 2023-09-08 PROCEDURE — 99396 PREV VISIT EST AGE 40-64: CPT | Mod: 25 | Performed by: PHYSICIAN ASSISTANT

## 2023-09-08 ASSESSMENT — ENCOUNTER SYMPTOMS
HEMATURIA: 0
NAUSEA: 0
MYALGIAS: 0
NERVOUS/ANXIOUS: 0
CONSTIPATION: 0
PALPITATIONS: 0
DYSURIA: 0
FEVER: 0
HEADACHES: 0
COUGH: 0
SHORTNESS OF BREATH: 0
FREQUENCY: 0
HEARTBURN: 0
HEMATOCHEZIA: 0
PARESTHESIAS: 0
ARTHRALGIAS: 0
SORE THROAT: 0
JOINT SWELLING: 0
DIARRHEA: 0
WEAKNESS: 0
DIZZINESS: 0
EYE PAIN: 0
CHILLS: 0
ABDOMINAL PAIN: 0

## 2023-09-08 NOTE — PROGRESS NOTES
SUBJECTIVE:   CC: Denny is an 47 year old who presents for preventative health visit.       9/8/2023     1:06 PM   Additional Questions   Roomed by Szue Ang CMA         9/8/2023     1:06 PM   Patient Reported Additional Medications   Patient reports taking the following new medications none       Healthy Habits:     Getting at least 3 servings of Calcium per day:  Yes    Bi-annual eye exam:  Yes    Dental care twice a year:  Yes    Sleep apnea or symptoms of sleep apnea:  None    Diet:  Regular (no restrictions)    Frequency of exercise:  1 day/week    Duration of exercise:  Less than 15 minutes    Taking medications regularly:  Yes    Medication side effects:  Not applicable    Additional concerns today:  No    Mom had MI in the last year at age 73. He says that mom's cardiologist recommended her kids consider having CAC screening. His brother and sister have both had CAC screening with scores of 0.     2 sons, 7th grade and 10th grade      Today's PHQ-2 Score:       9/8/2023     1:01 PM   PHQ-2 ( 1999 Pfizer)   Q1: Little interest or pleasure in doing things 0   Q2: Feeling down, depressed or hopeless 0   PHQ-2 Score 0   Q1: Little interest or pleasure in doing things Not at all   Q2: Feeling down, depressed or hopeless Not at all   PHQ-2 Score 0       Social History     Tobacco Use    Smoking status: Never     Passive exposure: Never    Smokeless tobacco: Never   Substance Use Topics    Alcohol use: Yes     Comment: maybe if even once a month             9/5/2023    11:23 AM   Alcohol Use   Prescreen: >3 drinks/day or >7 drinks/week? No       Last PSA: No results found for: PSA    Reviewed orders with patient. Reviewed health maintenance and updated orders accordingly - Yes  Lab work is in process  Labs reviewed in EPIC  BP Readings from Last 3 Encounters:   09/08/23 128/84   01/17/22 123/84   01/12/22 123/84    Wt Readings from Last 3 Encounters:   09/08/23 89.4 kg (197 lb 1.6 oz)   01/17/22 87.5 kg (193 lb)    01/12/22 87.9 kg (193 lb 11.2 oz)                  Patient Active Problem List   Diagnosis    Right inguinal hernia    Mixed hyperlipidemia     Past Surgical History:   Procedure Laterality Date    VASECTOMY         Social History     Tobacco Use    Smoking status: Never     Passive exposure: Never    Smokeless tobacco: Never   Substance Use Topics    Alcohol use: Yes     Comment: maybe if even once a month     Family History   Problem Relation Age of Onset    Myocardial Infarction Mother 73    Diabetes Father     Prostate Cancer Father     Hypertension Father     Hyperlipidemia Father     Diabetes Maternal Grandmother     Diabetes Maternal Grandfather     Colon Cancer No family hx of     Breast Cancer No family hx of          No current outpatient medications on file.     Allergies   Allergen Reactions    Ibuprofen Hives     Rash/hives on face     Recent Labs   Lab Test 01/12/22  1033 12/27/19  0752 06/14/19  1515 02/27/18  0812   * 129*  --  121*   HDL 48 46  --  37*   TRIG 87 237*  --  114   TSH  --   --  0.59  --         Reviewed and updated as needed this visit by clinical staff   Tobacco  Allergies  Meds  Problems  Med Hx  Surg Hx  Fam Hx          Reviewed and updated as needed this visit by Provider   Tobacco  Allergies  Meds  Problems  Med Hx  Surg Hx  Fam Hx         Past Medical History:   Diagnosis Date    NO ACTIVE PROBLEMS       Past Surgical History:   Procedure Laterality Date    VASECTOMY         Review of Systems   Constitutional:  Negative for chills and fever.   HENT:  Negative for congestion, ear pain, hearing loss and sore throat.    Eyes:  Negative for pain and visual disturbance.   Respiratory:  Negative for cough and shortness of breath.    Cardiovascular:  Negative for chest pain, palpitations and peripheral edema.   Gastrointestinal:  Negative for abdominal pain, constipation, diarrhea, heartburn, hematochezia and nausea.   Genitourinary:  Negative for dysuria,  "frequency, genital sores, hematuria, impotence, penile discharge and urgency.   Musculoskeletal:  Negative for arthralgias, joint swelling and myalgias.   Skin:  Negative for rash.   Neurological:  Negative for dizziness, weakness, headaches and paresthesias.   Psychiatric/Behavioral:  Negative for mood changes. The patient is not nervous/anxious.        OBJECTIVE:   /84   Pulse 65   Resp 19   Ht 1.778 m (5' 10\")   Wt 89.4 kg (197 lb 1.6 oz)   SpO2 96%   BMI 28.28 kg/m      Physical Exam  GENERAL: healthy, alert and no distress  EYES: Eyes grossly normal to inspection, PERRL and conjunctivae and sclerae normal  HENT: ear canals and TM's normal, nose and mouth without ulcers or lesions  NECK: no adenopathy, no asymmetry, masses, or scars and thyroid normal to palpation  RESP: lungs clear to auscultation - no rales, rhonchi or wheezes  CV: regular rate and rhythm, normal S1 S2, no S3 or S4, no murmur, click or rub, no peripheral edema and peripheral pulses strong  ABDOMEN: soft, nontender, no hepatosplenomegaly, no masses and bowel sounds normal  MS: no gross musculoskeletal defects noted, no edema  SKIN: no suspicious lesions or rashes  NEURO: Normal strength and tone, mentation intact and speech normal  PSYCH: mentation appears normal, affect normal/bright    Diagnostic Test Results:  Labs reviewed in Epic    ASSESSMENT/PLAN:   Routine general medical examination at a health care facility  Reviewed personal and family history. Reviewed age appropriate screenings. Reviewed healthy BP and BMI ranges. Counseled on lifestyle modifications for optimal mental and physical health.  Discussed age-appropriate health maintenance. Recommended any needed vaccinations. Continue to focus on well balanced diet and exercise.     Mixed hyperlipidemia  He is fasting today. Mom had MI in the last year at age 73. He says that mom's cardiologist recommended her kids consider having CAC screening. His brother and sister have " both had CAC screening with scores of 0. Will review lipids and ASCVD risk score and determine need for CAC screening afterward.  - Lipid panel reflex to direct LDL Fasting; Future    Screening for diabetes mellitus  - Glucose; Future    Overweight (BMI 25.0-29.9)       COUNSELING:   Reviewed preventive health counseling, as reflected in patient instructions        He reports that he has never smoked. He has never been exposed to tobacco smoke. He has never used smokeless tobacco.            Shante Hawkins PA-C  [facility]

## 2023-09-13 ENCOUNTER — VIRTUAL VISIT (OUTPATIENT)
Dept: FAMILY MEDICINE | Facility: CLINIC | Age: 48
End: 2023-09-13
Payer: COMMERCIAL

## 2023-09-13 DIAGNOSIS — E78.00 HYPERCHOLESTEROLEMIA WITH LDL GREATER THAN 190 MG/DL: Primary | ICD-10-CM

## 2023-09-13 PROCEDURE — 99213 OFFICE O/P EST LOW 20 MIN: CPT | Mod: VID | Performed by: PHYSICIAN ASSISTANT

## 2023-09-13 RX ORDER — ROSUVASTATIN CALCIUM 20 MG/1
20 TABLET, COATED ORAL DAILY
Qty: 90 TABLET | Refills: 0 | Status: SHIPPED | OUTPATIENT
Start: 2023-09-13 | End: 2023-11-15

## 2023-09-13 NOTE — PATIENT INSTRUCTIONS
"Start rosuvastatin  Recheck fasting labs in 2 months    What lifestyle changes can I make to help improve my cholesterol levels?     Exercise regularly.   Exercise can raise HDL cholesterol levels and reduce levels of LDL cholesterol and triglycerides. If you haven't been exercising, try to work up to 30 minutes, 4 to 6 times a week.     Lose weight if you are overweight.   Being overweight can raise your cholesterol levels. Losing weight, even just 5 or 10 pounds, can lower your total cholesterol, LDL cholesterol and triglyceride levels.     Eat a heart-healthy diet.   Eat plenty of fresh fruits and vegetables. Fruits and vegetables are naturally low in fat. Not only do they add flavor and variety to your diet, but they are also the best source of fiber, vitamins and minerals for your body. Aim for 5 cups of fruits and vegetables every day, not counting potatoes, corn and rice. Potatoes, corn and rice count as carbohydrates.     Pick \"good\" fats over \"bad\" fats. Fat is part of a healthy diet, but you need to know the difference between \"bad\" fats and \"good\" fats. \"Bad\" fats, such as saturated and trans fats, are found in foods such as butter; coconut and palm oil; saturated or partially hydrogenated vegetable fats such as shortening and margarine; animal fats in meats; and fats in whole milk dairy products. Limit the amount of saturated fat in your diet, and avoid trans fat completely. Unsaturated fat is the \"good\" fat. Most fats in fish, vegetables, grains and tree nuts are unsaturated. Try to eat unsaturated fat in place of saturated fat. For example, you can use olive oil or canola oil in cooking instead of butter.     Use healthier cooking methods. Baking, broiling and roasting are the healthiest ways to prepare meat, poultry and other foods. Trim any outside fat or skin before cooking. Lean cuts can be pan-broiled or stir-fried. Use either a nonstick pan or nonstick cooking spray instead of adding fats such as " "butter or margarine. When eating out, ask how food is prepared. You can request that your food be baked, broiled or roasted, rather than fried.   Look for other sources of protein. Fish, dry beans, tree nuts, peas and lentils offer protein, nutrients and fiber without the cholesterol and saturated fats that meats have. Consider eating one \"meatless\" meal each week. Try substituting beans for meat in a favorite recipe, such as lasagna or chili. Snack on a handful of almonds or pecans. Soy is also an excellent source of protein. Good examples of soy include soy milk, edamame (green soy beans), tofu and soy protein shakes.     Get more fiber in your diet. Add good sources of fiber to your meals. Examples include fruits, vegetables, whole grains (such as oat bran, whole and rolled oats and barley), legumes (such as beans and peas) and nuts and seeds (such as ground flax seed). In addition to fiber, whole grains supply B-vitamins and important nutrients not found in foods made with white flour.     Eat more fish. Fish are an excellent source of omega-3 fatty acids. Wild-caught oily fish, such as salmon, tuna, mackerel and sardines, are the best sources of omega-3s, but all fish contain some amount of this beneficial fatty acid. Aim for 2 6-oz servings each week.       "

## 2023-09-13 NOTE — PROGRESS NOTES
Denny is a 47 year old who is being evaluated via a billable video visit.      How would you like to obtain your AVS? MyChart  If the video visit is dropped, the invitation should be resent by: Text to cell phone: [phone]  Will anyone else be joining your video visit? No          Assessment & Plan     Hypercholesterolemia with LDL greater than 190 mg/dL  Start rosuvastatin. Discussed r/b/se. Discussed lifestyle changes. Recheck fasting labs in 2 months.  - Hepatic panel (Albumin, ALT, AST, Bili, Alk Phos, TP); Future  - Lipid panel reflex to direct LDL Fasting; Future  - **ALT FUTURE 2mo; Future  - rosuvastatin (CRESTOR) 20 MG tablet; Take 1 tablet (20 mg) by mouth daily    Shante Hawkins PA-C  [facility]   Denny is a 47 year old, presenting for the following health issues:  RECHECK        9/13/2023     8:46 AM   Additional Questions   Roomed by Luzmaria YING CMA   Accompanied by NA         9/13/2023     8:46 AM   Patient Reported Additional Medications   Patient reports taking the following new medications None       History of Present Illness       Hyperlipidemia:  He presents for follow up of hyperlipidemia.   He is not taking medication to lower cholesterol. He is not having myalgia or other side effects to statin medications.    He eats 2-3 servings of fruits and vegetables daily.He consumes 1 sweetened beverage(s) daily.He exercises with enough effort to increase his heart rate 10 to 19 minutes per day.  He exercises with enough effort to increase his heart rate 3 or less days per week.   He is taking medications regularly.       Recent Labs   Lab Test 09/08/23  1358 01/12/22  1033   CHOL 270* 243*   HDL 43 48   * 178*   TRIG 94 87     The 10-year ASCVD risk score (Dasia CABRERA, et al., 2019) is: 4.4%    Values used to calculate the score:      Age: 47 years      Sex: Male      Is Non- : No      Diabetic: No      Tobacco smoker: No      Systolic  "Blood Pressure: 128 mmHg      Is BP treated: No      HDL Cholesterol: 43 mg/dL      Total Cholesterol: 270 mg/dL     Mom had MI at age 73, dad has hyperlipidemia, brother and sister have elevated cholesterol.    He is motivated to make lifestyle changes. Has been eating a lot of fast food over past few years and not getting a lot of dedicated exercise.    Review of Systems   Constitutional, HEENT, cardiovascular, pulmonary, gi and gu systems are negative, except as otherwise noted.      Objective    Vitals - Patient Reported  Weight (Patient Reported): 87.5 kg (193 lb)  Height (Patient Reported): 177.8 cm (5' 10\")  BMI (Based on Pt Reported Ht/Wt): 27.69  Pain Score: No Pain (0)        Physical Exam   GENERAL: Healthy, alert and no distress  EYES: Eyes grossly normal to inspection.  No discharge or erythema, or obvious scleral/conjunctival abnormalities.  RESP: No audible wheeze, cough, or visible cyanosis.  No visible retractions or increased work of breathing.    SKIN: Visible skin clear. No significant rash, abnormal pigmentation or lesions.  NEURO: Cranial nerves grossly intact.  Mentation and speech appropriate for age.  PSYCH: Mentation appears normal, affect normal/bright, judgement and insight intact, normal speech and appearance well-groomed.        Video-Visit Details    Type of service:  Video Visit     Originating Location (pt. Location): Home    Distant Location (provider location):  On-site  Platform used for Video Visit: Rocco      "

## 2023-09-14 ENCOUNTER — LAB (OUTPATIENT)
Dept: LAB | Facility: CLINIC | Age: 48
End: 2023-09-14
Payer: COMMERCIAL

## 2023-09-14 DIAGNOSIS — E78.00 HYPERCHOLESTEROLEMIA WITH LDL GREATER THAN 190 MG/DL: ICD-10-CM

## 2023-09-14 LAB
ALBUMIN SERPL BCG-MCNC: 4.9 G/DL (ref 3.5–5.2)
ALP SERPL-CCNC: 46 U/L (ref 40–129)
ALT SERPL W P-5'-P-CCNC: 53 U/L (ref 0–70)
AST SERPL W P-5'-P-CCNC: 27 U/L (ref 0–45)
BILIRUB DIRECT SERPL-MCNC: <0.2 MG/DL (ref 0–0.3)
BILIRUB SERPL-MCNC: 0.5 MG/DL
PROT SERPL-MCNC: 7.4 G/DL (ref 6.4–8.3)

## 2023-09-14 PROCEDURE — 36415 COLL VENOUS BLD VENIPUNCTURE: CPT

## 2023-09-14 PROCEDURE — 80076 HEPATIC FUNCTION PANEL: CPT

## 2023-11-08 ENCOUNTER — ALLIED HEALTH/NURSE VISIT (OUTPATIENT)
Dept: FAMILY MEDICINE | Facility: CLINIC | Age: 48
End: 2023-11-08
Payer: COMMERCIAL

## 2023-11-08 DIAGNOSIS — Z23 ENCOUNTER FOR IMMUNIZATION: Primary | ICD-10-CM

## 2023-11-08 PROCEDURE — 99207 PR NO CHARGE NURSE ONLY: CPT

## 2023-11-08 PROCEDURE — 90471 IMMUNIZATION ADMIN: CPT

## 2023-11-08 PROCEDURE — 90746 HEPB VACCINE 3 DOSE ADULT IM: CPT

## 2023-11-08 NOTE — PROGRESS NOTES
Prior to immunization administration, verified patients identity using patient s name and date of birth. Please see Immunization Activity for additional information.     Screening Questionnaire for Adult Immunization    Are you sick today?   No   Do you have allergies to medications, food, a vaccine component or latex?   No   Have you ever had a serious reaction after receiving a vaccination?   No   Do you have a long-term health problem with heart, lung, kidney, or metabolic disease (e.g., diabetes), asthma, a blood disorder, no spleen, complement component deficiency, a cochlear implant, or a spinal fluid leak?  Are you on long-term aspirin therapy?   No   Do you have cancer, leukemia, HIV/AIDS, or any other immune system problem?   No   Do you have a parent, brother, or sister with an immune system problem?   No   In the past 3 months, have you taken medications that affect  your immune system, such as prednisone, other steroids, or anticancer drugs; drugs for the treatment of rheumatoid arthritis, Crohn s disease, or psoriasis; or have you had radiation treatments?   No   Have you had a seizure, or a brain or other nervous system problem?   No   During the past year, have you received a transfusion of blood or blood    products, or been given immune (gamma) globulin or antiviral drug?   No   For women: Are you pregnant or is there a chance you could become       pregnant during the next month?   No   Have you received any vaccinations in the past 4 weeks?   No     Immunization questionnaire answers were all negative.    I have reviewed the following standing orders:   This patient is due and qualifies for the Hepatitis B vaccine.    Click here for Hepatitis B Standing Order    I have reviewed the vaccines inclusion and exclusion criteria; No concerns regarding eligibility.     Patient instructed to remain in clinic for 15 minutes afterwards, and to report any adverse reactions.     Screening performed by  Jamila Craven MA on 11/8/2023 at 4:22 PM.

## 2023-11-09 ENCOUNTER — MYC MEDICAL ADVICE (OUTPATIENT)
Dept: FAMILY MEDICINE | Facility: CLINIC | Age: 48
End: 2023-11-09
Payer: COMMERCIAL

## 2023-11-10 ENCOUNTER — OFFICE VISIT (OUTPATIENT)
Dept: FAMILY MEDICINE | Facility: CLINIC | Age: 48
End: 2023-11-10
Payer: COMMERCIAL

## 2023-11-10 VITALS
OXYGEN SATURATION: 97 % | SYSTOLIC BLOOD PRESSURE: 130 MMHG | DIASTOLIC BLOOD PRESSURE: 86 MMHG | RESPIRATION RATE: 12 BRPM | HEART RATE: 70 BPM | HEIGHT: 70 IN | WEIGHT: 186 LBS | TEMPERATURE: 97.9 F | BODY MASS INDEX: 26.63 KG/M2

## 2023-11-10 DIAGNOSIS — E78.00 HYPERCHOLESTEROLEMIA WITH LDL GREATER THAN 190 MG/DL: ICD-10-CM

## 2023-11-10 DIAGNOSIS — Z23 NEED FOR VACCINATION: ICD-10-CM

## 2023-11-10 DIAGNOSIS — R55 VASOVAGAL SYNCOPE: Primary | ICD-10-CM

## 2023-11-10 PROCEDURE — 99213 OFFICE O/P EST LOW 20 MIN: CPT | Performed by: NURSE PRACTITIONER

## 2023-11-10 PROCEDURE — 90480 ADMN SARSCOV2 VAC 1/ONLY CMP: CPT | Performed by: NURSE PRACTITIONER

## 2023-11-10 PROCEDURE — 91320 SARSCV2 VAC 30MCG TRS-SUC IM: CPT | Performed by: NURSE PRACTITIONER

## 2023-11-10 ASSESSMENT — PAIN SCALES - GENERAL: PAINLEVEL: NO PAIN (0)

## 2023-11-10 NOTE — PROGRESS NOTES
"  Assessment & Plan     Vasovagal syncope  Based off history.  Normal exam today.  VSS.  Should be seen for follow-up if recurrent symptoms.     Need for vaccination  - COVID-19 12+ (2023-24) (PFIZER)             IRENA Lee CNP  [facility] DERICK Baldwin is a 48 year old, presenting for the following health issues:  vaso vagal        11/10/2023     1:47 PM   Additional Questions   Roomed by Lyn Brown CELINE       Has been getting light-headed and having blurry vision and had cold sweats, then almost fainted. This only happened yesterday.     Went on cholesterol meds 2 months ago and is wondering if that has anything to do with it.      History of Present Illness       Reason for visit:  Almost fainted yesterday  Symptom onset:  1-3 days ago    He eats 2-3 servings of fruits and vegetables daily.He consumes 1 sweetened beverage(s) daily.He exercises with enough effort to increase his heart rate 20 to 29 minutes per day.  He exercises with enough effort to increase his heart rate 4 days per week.   He is taking medications regularly.     Yesterday while at son's ortho appt started to feel light headed, sweaty and had blurry vision while doctor was explaining MRI results.  Thought he was going to faint.  Ortho doctor said it was common, but Denny wanted to be seen today for evaluation.  Had little water and 3 cups of coffee yesterday.  Has not experienced symptoms like that before. No similar recurrent symptoms.  No CP or trouble breathing.  No headaches.       Review of Systems   Constitutional, HEENT, cardiovascular, pulmonary, gi and gu systems are negative, except as otherwise noted.      Objective    /86   Pulse 70   Temp 97.9  F (36.6  C) (Oral)   Resp 12   Ht 1.778 m (5' 10\")   Wt 84.4 kg (186 lb)   SpO2 97%   BMI 26.69 kg/m    Body mass index is 26.69 kg/m .  Physical Exam   GENERAL: healthy, alert and no distress  EYES: Eyes grossly normal to inspection, " PERRL and conjunctivae and sclerae normal  HENT: ear canals and TM's normal, nose and mouth without ulcers or lesions  NECK: no adenopathy, no asymmetry, masses, or scars and thyroid normal to palpation  RESP: lungs clear to auscultation - no rales, rhonchi or wheezes  CV: regular rate and rhythm, normal S1 S2, no S3 or S4, no murmur, click or rub, no peripheral edema and peripheral pulses strong  MS: no gross musculoskeletal defects noted, no edema  SKIN: no suspicious lesions or rashes  NEURO: Normal strength and tone, mentation intact, and speech normal  PSYCH: mentation appears normal, affect normal/bright    No results found for this or any previous visit (from the past 24 hour(s)).

## 2023-11-10 NOTE — TELEPHONE ENCOUNTER
Huddled with KS concerning ALBARO spot @ 2. OK to schedule appointment for today.     Sheng Elkins RN on 11/10/2023 at 10:17 AM

## 2023-11-10 NOTE — TELEPHONE ENCOUNTER
"Pt calls, see G-volutionhart message,     S-(situation): felt faint at son's doctors appointment     B-(background): witnessed by another provider that felt was related to vasovagal reaction, pt also informs had 3 coffees and 1 glass of water, did have breakfast and lunch, feels great now, color came back stat, discussed at length, provider \"was not worried\" \"we see it all the time\", brief episode, declines needing me to run a protocol as feels fine now, also started new cholesterol medication, wants to discuss with provider as met his deductible this year and wants to make sure not related to any possible heart issue, pt driving now and working, willing to take off work today, wants 2:00 or after    A-(assessment): possible vasovagal     R-(recommendations): routed high priority to  TC and RN, can someone call him to schedule appointment, there is a 2:00 with another provider but not sure if can be used, call pt back on cell     Telephone Information:   Mobile [phone]     Tatiana Dick, RN, BSN  [facility]      "

## 2023-11-15 ENCOUNTER — LAB (OUTPATIENT)
Dept: LAB | Facility: CLINIC | Age: 48
End: 2023-11-15
Payer: COMMERCIAL

## 2023-11-15 DIAGNOSIS — E78.00 HYPERCHOLESTEROLEMIA WITH LDL GREATER THAN 190 MG/DL: ICD-10-CM

## 2023-11-15 LAB
ALT SERPL W P-5'-P-CCNC: 28 U/L (ref 0–70)
CHOLEST SERPL-MCNC: 122 MG/DL
HDLC SERPL-MCNC: 39 MG/DL
LDLC SERPL CALC-MCNC: 68 MG/DL
NONHDLC SERPL-MCNC: 83 MG/DL
TRIGL SERPL-MCNC: 73 MG/DL

## 2023-11-15 PROCEDURE — 36415 COLL VENOUS BLD VENIPUNCTURE: CPT | Performed by: NURSE PRACTITIONER

## 2023-11-15 PROCEDURE — 80061 LIPID PANEL: CPT

## 2023-11-15 PROCEDURE — 84460 ALANINE AMINO (ALT) (SGPT): CPT | Performed by: NURSE PRACTITIONER

## 2023-11-15 RX ORDER — ROSUVASTATIN CALCIUM 20 MG/1
20 TABLET, COATED ORAL DAILY
Qty: 90 TABLET | Refills: 3 | Status: ON HOLD | OUTPATIENT
Start: 2023-11-15 | End: 2024-07-24

## 2023-12-06 ENCOUNTER — MYC MEDICAL ADVICE (OUTPATIENT)
Dept: FAMILY MEDICINE | Facility: CLINIC | Age: 48
End: 2023-12-06
Payer: COMMERCIAL

## 2023-12-06 NOTE — TELEPHONE ENCOUNTER
See patient's MyChart message   - Patient states that he has been experiencing a dry scalp and dryness to his eyebrows since starting the statin medication   - Patient states that he has been using Vanicream shampoo and face wash which has been effective in decreasing the itchiness   - Patient wondering if this a normal side effect to statin medications     rosuvastatin (CRESTOR) 20 MG tablet 90 tablet 3 11/15/2023  No   Sig - Route: Take 1 tablet (20 mg) by mouth daily - Oral     Shante Hawkins PA-C, please review and advise.     Pat RAMOS RN   [facility]

## 2023-12-08 ENCOUNTER — TRANSFERRED RECORDS (OUTPATIENT)
Dept: HEALTH INFORMATION MANAGEMENT | Facility: CLINIC | Age: 48
End: 2023-12-08
Payer: COMMERCIAL

## 2024-01-09 ENCOUNTER — OFFICE VISIT (OUTPATIENT)
Dept: FAMILY MEDICINE | Facility: CLINIC | Age: 49
End: 2024-01-09
Payer: COMMERCIAL

## 2024-01-09 VITALS
TEMPERATURE: 98.3 F | OXYGEN SATURATION: 98 % | SYSTOLIC BLOOD PRESSURE: 132 MMHG | HEART RATE: 62 BPM | BODY MASS INDEX: 28.63 KG/M2 | DIASTOLIC BLOOD PRESSURE: 82 MMHG | WEIGHT: 200 LBS | RESPIRATION RATE: 12 BRPM | HEIGHT: 70 IN

## 2024-01-09 DIAGNOSIS — J06.9 VIRAL URI: ICD-10-CM

## 2024-01-09 DIAGNOSIS — J02.9 SORE THROAT: Primary | ICD-10-CM

## 2024-01-09 LAB — DEPRECATED S PYO AG THROAT QL EIA: NEGATIVE

## 2024-01-09 PROCEDURE — 87651 STREP A DNA AMP PROBE: CPT | Performed by: FAMILY MEDICINE

## 2024-01-09 PROCEDURE — 99213 OFFICE O/P EST LOW 20 MIN: CPT | Performed by: FAMILY MEDICINE

## 2024-01-09 ASSESSMENT — ENCOUNTER SYMPTOMS
HEADACHES: 0
SORE THROAT: 1
AGITATION: 0
DYSURIA: 0
DIFFICULTY URINATING: 0
ARTHRALGIAS: 0
COUGH: 1

## 2024-01-09 NOTE — PROGRESS NOTES
"  Assessment & Plan     Sore throat  - Streptococcus A Rapid Screen w/Reflex to PCR - Clinic Collect  - Group A Streptococcus PCR Throat Swab    Viral URI  Discussed supportive care .   Normal saline to the nose .  Saline gargles .       BMI:   Estimated body mass index is 28.7 kg/m  as calculated from the following:    Height as of this encounter: 1.778 m (5' 10\").    Weight as of this encounter: 90.7 kg (200 lb).   Weight management plan: Discussed healthy diet and exercise guidelines        Nirali Torres MD  [facility] ANANT Baldwin is a 48 year old, presenting for the following health issues:    Cough (Productive/) and Pharyngitis (Dry throat)        1/9/2024     2:44 PM   Additional Questions   Roomed by Isabel POLANCO       History of Present Illness       Reason for visit:  Cough and Dry throat and clear mucus  Symptom onset:  1-2 weeks ago  Symptoms include:  Cough and dry throat  Symptom intensity:  Moderate  Symptom progression:  Improving  Had these symptoms before:  No  What makes it worse:  None  What makes it better:  Water    He eats 2-3 servings of fruits and vegetables daily.He consumes 1 sweetened beverage(s) daily.He exercises with enough effort to increase his heart rate 20 to 29 minutes per day.  He exercises with enough effort to increase his heart rate 3 or less days per week.   He is taking medications regularly.         Review of Systems   HENT:  Positive for sore throat.    Respiratory:  Positive for cough.    Genitourinary:  Negative for difficulty urinating and dysuria.   Musculoskeletal:  Negative for arthralgias.   Neurological:  Negative for headaches.   Psychiatric/Behavioral:  Negative for agitation and behavioral problems.             Objective    /82 (Cuff Size: Adult Large)   Pulse 62   Temp 98.3  F (36.8  C) (Oral)   Resp 12   Ht 1.778 m (5' 10\")   Wt 90.7 kg (200 lb)   SpO2 98%   BMI 28.70 kg/m    Body mass index is 28.7 kg/m .  Physical " Exam  HENT:      Nose: Congestion and rhinorrhea present.      Mouth/Throat:      Mouth: Mucous membranes are moist.      Pharynx: Posterior oropharyngeal erythema present.   Cardiovascular:      Rate and Rhythm: Normal rate.   Pulmonary:      Effort: Pulmonary effort is normal.   Skin:     General: Skin is warm.   Neurological:      General: No focal deficit present.   Psychiatric:         Mood and Affect: Mood normal.         Behavior: Behavior normal.

## 2024-01-10 LAB — GROUP A STREP BY PCR: NOT DETECTED

## 2024-04-07 ENCOUNTER — MYC REFILL (OUTPATIENT)
Dept: FAMILY MEDICINE | Facility: CLINIC | Age: 49
End: 2024-04-07
Payer: COMMERCIAL

## 2024-04-07 DIAGNOSIS — E78.00 HYPERCHOLESTEROLEMIA WITH LDL GREATER THAN 190 MG/DL: ICD-10-CM

## 2024-04-08 RX ORDER — ROSUVASTATIN CALCIUM 20 MG/1
20 TABLET, COATED ORAL DAILY
Qty: 90 TABLET | Refills: 3 | OUTPATIENT
Start: 2024-04-08

## 2024-05-29 ENCOUNTER — ALLIED HEALTH/NURSE VISIT (OUTPATIENT)
Dept: FAMILY MEDICINE | Facility: CLINIC | Age: 49
End: 2024-05-29
Payer: COMMERCIAL

## 2024-05-29 DIAGNOSIS — Z23 ENCOUNTER FOR IMMUNIZATION: Primary | ICD-10-CM

## 2024-05-29 PROCEDURE — 90746 HEPB VACCINE 3 DOSE ADULT IM: CPT

## 2024-05-29 PROCEDURE — 90471 IMMUNIZATION ADMIN: CPT

## 2024-05-29 PROCEDURE — 99207 PR NO CHARGE NURSE ONLY: CPT

## 2024-06-27 ENCOUNTER — MYC MEDICAL ADVICE (OUTPATIENT)
Dept: FAMILY MEDICINE | Facility: CLINIC | Age: 49
End: 2024-06-27
Payer: COMMERCIAL

## 2024-06-27 ENCOUNTER — NURSE TRIAGE (OUTPATIENT)
Dept: FAMILY MEDICINE | Facility: CLINIC | Age: 49
End: 2024-06-27
Payer: COMMERCIAL

## 2024-06-27 NOTE — TELEPHONE ENCOUNTER
S-(situation): Patient returning call to clinic to triage myC message received 6/27/24.     B-(background): Patient MyC message: Good Morning,  out of nowhere the last three evenings I have had back pain but only when I go to sleep.  When I wake up and walk around the pain goes away so I thought maybe it was just a muscle strain but usually that type of pain continues throughout the day.  It is very odd to me so I wanted to reach out and see what I should do.  My father had prostate cancer that was caught very early so basic meds had to be done and he is fine but I wanted to see if I should come in.  I had a physical last year and went on cholesterol meds so not sure if I should come in again early for this years physical exam.  Thank you, Denny     A-(assessment): no current back pain. Patient notes he has been experiencing mid-back pain when going to sleep at night for the past 3 nights. Patient states he does not have pain when laying on the couch, no pain when laying on different mattresses. Patient states it does not feel like a muscle strain. Patient states his father had prostate cancer, worries about pain being related to possible cancer. Patient also notes he has had a lot of stress at work. Pain does not radiate. No abdominal pain. No other symptoms. Patient has not tried tylenol or ibuprofen.     R-(recommendations): Patient is requesting appointment. Scheduled for OV 6/28/24 to discuss. Patient wanted appointment tomorrow to see if he has pain again tonight. No further questions at this time.       Reason for Disposition   Patient wants to be seen    Additional Information   Negative: Passed out (i.e., fainted, collapsed and was not responding)   Negative: Shock suspected (e.g., cold/pale/clammy skin, too weak to stand, low BP, rapid pulse)   Negative: Sounds like a life-threatening emergency to the triager   Negative: Major injury to the back (e.g., MVA, fall > 10 feet or 3 meters, penetrating injury,  etc.)   Negative: Pain in the upper back over the ribs (rib cage) that radiates (travels) into the chest   Negative: Pain in the upper back over the ribs (rib cage) and worsened by coughing (or clearly increases with breathing)   Negative: Back pain during pregnancy   Negative: SEVERE back pain of sudden onset and age > 60 years   Negative: SEVERE abdominal pain (e.g., excruciating)   Negative: Abdominal pain and age > 60 years   Negative: Unable to urinate (or only a few drops) and bladder feels very full   Negative: Loss of bladder or bowel control (urine or bowel incontinence; wetting self, leaking stool) of new-onset   Negative: Numbness (loss of sensation) in groin or rectal area   Negative: Pain radiates into groin, scrotum   Negative: Blood in urine (red, pink, or tea-colored)   Negative: Vomiting and pain over lower ribs of back (i.e., flank - kidney area)   Negative: Weakness of a leg or foot (e.g., unable to bear weight, dragging foot)   Negative: Patient sounds very sick or weak to the triager   Negative: Fever > 100.4 F (38.0 C) and flank pain   Negative: Pain or burning with passing urine (urination)   Negative: SEVERE back pain (e.g., excruciating, unable to do any normal activities) and not improved after pain medicine and CARE ADVICE   Negative: Numbness in an arm or hand (i.e., loss of sensation) and upper back pain   Negative: Numbness in a leg or foot (i.e., loss of sensation)   Negative: High-risk adult (e.g., history of cancer, history of HIV, or history of IV Drug Use)   Negative: Soft tissue infection (e.g., abscess, cellulitis) or other serious infection (e.g., bacteremia) in last 2 weeks   Negative: Painful rash with multiple small blisters grouped together (i.e., dermatomal distribution or 'band' or 'stripe')   Negative: Pain radiates into the thigh or further down the leg, and in both legs   Negative: Age > 50 and no history of prior similar back pain   Negative: MODERATE back pain (e.g.,  interferes with normal activities) and present > 3 days   Negative: Pain radiates into the thigh or further down the leg    Protocols used: Back Pain-A-ANTONIA SCHWARTZ RN 6/27/2024 at 11:50 AM

## 2024-06-28 ENCOUNTER — OFFICE VISIT (OUTPATIENT)
Dept: FAMILY MEDICINE | Facility: CLINIC | Age: 49
End: 2024-06-28
Payer: COMMERCIAL

## 2024-06-28 ENCOUNTER — ANCILLARY PROCEDURE (OUTPATIENT)
Dept: GENERAL RADIOLOGY | Facility: CLINIC | Age: 49
End: 2024-06-28
Payer: COMMERCIAL

## 2024-06-28 VITALS
OXYGEN SATURATION: 99 % | HEIGHT: 69 IN | DIASTOLIC BLOOD PRESSURE: 84 MMHG | HEART RATE: 67 BPM | TEMPERATURE: 98.1 F | BODY MASS INDEX: 29.47 KG/M2 | WEIGHT: 199 LBS | RESPIRATION RATE: 19 BRPM | SYSTOLIC BLOOD PRESSURE: 129 MMHG

## 2024-06-28 DIAGNOSIS — Z80.42 FAMILY HX OF PROSTATE CANCER: ICD-10-CM

## 2024-06-28 DIAGNOSIS — M54.50 ACUTE BILATERAL LOW BACK PAIN WITHOUT SCIATICA: Primary | ICD-10-CM

## 2024-06-28 DIAGNOSIS — M54.6 ACUTE BILATERAL THORACIC BACK PAIN: ICD-10-CM

## 2024-06-28 LAB
ERYTHROCYTE [DISTWIDTH] IN BLOOD BY AUTOMATED COUNT: 12.8 % (ref 10–15)
HCT VFR BLD AUTO: 47.9 % (ref 40–53)
HGB BLD-MCNC: 16.2 G/DL (ref 13.3–17.7)
MCH RBC QN AUTO: 28.8 PG (ref 26.5–33)
MCHC RBC AUTO-ENTMCNC: 33.8 G/DL (ref 31.5–36.5)
MCV RBC AUTO: 85 FL (ref 78–100)
PLATELET # BLD AUTO: 161 10E3/UL (ref 150–450)
PSA SERPL DL<=0.01 NG/ML-MCNC: 0.43 NG/ML (ref 0–2.5)
RBC # BLD AUTO: 5.62 10E6/UL (ref 4.4–5.9)
WBC # BLD AUTO: 7 10E3/UL (ref 4–11)

## 2024-06-28 PROCEDURE — 85027 COMPLETE CBC AUTOMATED: CPT

## 2024-06-28 PROCEDURE — 72100 X-RAY EXAM L-S SPINE 2/3 VWS: CPT | Mod: TC | Performed by: RADIOLOGY

## 2024-06-28 PROCEDURE — 36415 COLL VENOUS BLD VENIPUNCTURE: CPT

## 2024-06-28 PROCEDURE — 99213 OFFICE O/P EST LOW 20 MIN: CPT

## 2024-06-28 PROCEDURE — G0103 PSA SCREENING: HCPCS

## 2024-06-28 PROCEDURE — 72070 X-RAY EXAM THORAC SPINE 2VWS: CPT | Mod: TC | Performed by: RADIOLOGY

## 2024-06-28 ASSESSMENT — ENCOUNTER SYMPTOMS: BACK PAIN: 1

## 2024-06-28 NOTE — PROGRESS NOTES
"  Assessment & Plan     (M54.50) Acute bilateral low back pain without sciatica  (primary encounter diagnosis)  (M54.6) Acute bilateral thoracic back pain  (Z80.42) Family hx of prostate cancer  Comment: Concern the patient is having nighttime back pain that wakes him up.  Back exam negative today, however very possible that it could be musculoskeletal in nature caused by poor posture while sleeping.  But given patient's family history (father) of early onset prostate cancer shared decision to move forward with PSA, CBC, and x-rays today.  We did discuss conservative treatment as well which patient can utilize in the meantime.  Will follow-up on results and whether further direction is necessary.  If everything is unremarkable patient to follow-up if not noting any improvement. Patient fully understands and is agreeable with plan of care, at this point patient will follow up as needed unless acute concerns arise in the meantime.  Plan: XR Lumbar Spine 2/3 Views, XR Thoracic Spine 2         Views, CBC with platelets, PSA, screen      BMI  Estimated body mass index is 29.39 kg/m  as calculated from the following:    Height as of this encounter: 1.753 m (5' 9\").    Weight as of this encounter: 90.3 kg (199 lb).     Klaudia Baldwin is a 48 year old, presenting for the following health issues:  Back Pain        6/28/2024     8:38 AM   Additional Questions   Roomed by Kelsey MENDOZA CMA     History of Present Illness       Back Pain:  He presents for follow up of back pain. Patient's back pain is a new problem.    Original cause of back pain: not sure  First noticed back pain: in the last week  Patient feels back pain: dailyLocation of back pain:  Right middle of back and left middle of back  Description of back pain: sharp and shooting  Back pain spreads: nowhere    Since patient first noticed back pain, pain is: unchanged  Does back pain interfere with his job:  No  On a scale of 1-10 (10 being the worst), patient describes " pain as:  7  What makes back pain worse: lying down   Acupuncture: not tried  Acetaminophen: not tried  Activity or exercise: not tried  Chiropractor:  Not tried  Cold: not tried  Heat: not tried  Massage: not tried  Muscle relaxants: not tried  NSAIDS: not tried  Opioids: not tried  Physical Therapy: not tried  Rest: not tried  Steroid Injection: not tried  Stretching: not tried  Surgery: not tried  TENS unit: not tried  Topical pain relievers: not tried  Other healthcare providers patient is seeing for back pain: None    He eats 2-3 servings of fruits and vegetables daily.He consumes 1 sweetened beverage(s) daily.He exercises with enough effort to increase his heart rate 10 to 19 minutes per day.  He exercises with enough effort to increase his heart rate 3 or less days per week. He is missing 1 dose(s) of medications per week.     Pain History:  When did you first notice your pain?  Monday was first day   Have you seen anyone else for your pain? No  Where in your body do you have pain? Back Pain  Onset/Duration: last week  Description:   Location of pain: middle of back both  Character of pain: sharp and shouting  Pain radiation: none  New numbness or weakness in legs, not attributed to pain: no   Intensity: Currently 7/10  Progression of Symptoms: same  History:   Specific cause: none  Pain interferes with job: No  History of back problems: no prior back problems  Any previous MRI or X-rays: None  Sees a specialist for back pain: No  Alleviating factors:   Improved by: sitting up/ getting up    Precipitating factors:  Worsened by: Lying Flat  Therapies tried and outcome: none    Accompanying Signs & Symptoms:  Risk of Fracture: None  Risk of Cauda Equina: None  Risk of Infection: None  Risk of Cancer: INCREASED night time pain that wakes patient up  Risk of Ankylosing Spondylitis: Onset at age <35, male, AND morning back stiffness  no     -Shooting pain mid/low back starting Monday night  -Has continued last 4  "nights   -No trauma noted to area.   -After about an hour of moving around pain goes away in the morning  -Does have some night sweats, more so on pillow  -Patient's father had prostate cancer around 50  -Concerned about this and would like to have evaluated.     Review of Systems  Constitutional, , musculoskeletal, neuro systems are negative, except as otherwise noted.      Objective    /84 (BP Location: Right arm, Patient Position: Sitting, Cuff Size: Adult Large)   Pulse 67   Temp 98.1  F (36.7  C) (Oral)   Resp 19   Ht 1.753 m (5' 9\")   Wt 90.3 kg (199 lb)   SpO2 99%   BMI 29.39 kg/m    Body mass index is 29.39 kg/m .  Physical Exam  Vitals and nursing note reviewed.   Constitutional:       General: He is not in acute distress.     Appearance: Normal appearance. He is not ill-appearing.   Cardiovascular:      Rate and Rhythm: Normal rate and regular rhythm.   Pulmonary:      Effort: Pulmonary effort is normal.   Musculoskeletal:         General: No tenderness or signs of injury.      Cervical back: Normal.      Thoracic back: Normal.      Lumbar back: No swelling, edema, spasms or bony tenderness. Normal range of motion. Negative right straight leg raise test and negative left straight leg raise test.   Lymphadenopathy:      Cervical: No cervical adenopathy.   Skin:     General: Skin is warm and dry.   Neurological:      Mental Status: He is alert.   Psychiatric:         Mood and Affect: Mood normal.         Behavior: Behavior normal.         Thought Content: Thought content normal.         Judgment: Judgment normal.        Signed Electronically by: IRENA Cody CNP    "

## 2024-06-28 NOTE — PATIENT INSTRUCTIONS
Ibuprofen (600 mg) every 6-8 hours   Naproxen (500 mg) twice daily  Tylenol (1000 mg) three times a day  -please take food with these    Topical Analgesics (Icy Hot, Biofreeze, Voltaren Gel)  -can use up to 3-4 times aday    Ice (inflammation)/ Heat (Muscle relaxing)    Xrays and lab work today - will be in touch w/ you on these

## 2024-07-24 ENCOUNTER — OFFICE VISIT (OUTPATIENT)
Dept: URGENT CARE | Facility: URGENT CARE | Age: 49
End: 2024-07-24
Payer: COMMERCIAL

## 2024-07-24 ENCOUNTER — APPOINTMENT (OUTPATIENT)
Dept: CT IMAGING | Facility: CLINIC | Age: 49
End: 2024-07-24
Attending: EMERGENCY MEDICINE
Payer: COMMERCIAL

## 2024-07-24 ENCOUNTER — HOSPITAL ENCOUNTER (OUTPATIENT)
Facility: CLINIC | Age: 49
Setting detail: OBSERVATION
Discharge: HOME | End: 2024-07-25
Attending: EMERGENCY MEDICINE | Admitting: SURGERY
Payer: COMMERCIAL

## 2024-07-24 VITALS
DIASTOLIC BLOOD PRESSURE: 94 MMHG | HEART RATE: 81 BPM | OXYGEN SATURATION: 97 % | WEIGHT: 198 LBS | BODY MASS INDEX: 29.24 KG/M2 | SYSTOLIC BLOOD PRESSURE: 139 MMHG | TEMPERATURE: 99.4 F

## 2024-07-24 DIAGNOSIS — R10.31 RIGHT LOWER QUADRANT PAIN: ICD-10-CM

## 2024-07-24 DIAGNOSIS — K35.30 ACUTE APPENDICITIS WITH LOCALIZED PERITONITIS, WITHOUT PERFORATION, ABSCESS, OR GANGRENE: ICD-10-CM

## 2024-07-24 DIAGNOSIS — R10.31 ABDOMINAL PAIN, RIGHT LOWER QUADRANT: Primary | ICD-10-CM

## 2024-07-24 LAB
ALBUMIN SERPL BCG-MCNC: 4.8 G/DL (ref 3.5–5.2)
ALBUMIN UR-MCNC: NEGATIVE MG/DL
ALP SERPL-CCNC: 51 U/L (ref 40–150)
ALT SERPL W P-5'-P-CCNC: 46 U/L (ref 0–70)
ANION GAP SERPL CALCULATED.3IONS-SCNC: 16 MMOL/L (ref 7–15)
APPEARANCE UR: CLEAR
AST SERPL W P-5'-P-CCNC: 22 U/L (ref 0–45)
BASOPHILS # BLD AUTO: 0 10E3/UL (ref 0–0.2)
BASOPHILS NFR BLD AUTO: 0 %
BILIRUB SERPL-MCNC: 1.1 MG/DL
BILIRUB UR QL STRIP: NEGATIVE
BUN SERPL-MCNC: 17.1 MG/DL (ref 6–20)
CALCIUM SERPL-MCNC: 9.1 MG/DL (ref 8.8–10.4)
CHLORIDE SERPL-SCNC: 99 MMOL/L (ref 98–107)
COLOR UR AUTO: YELLOW
CREAT SERPL-MCNC: 1.05 MG/DL (ref 0.67–1.17)
EGFRCR SERPLBLD CKD-EPI 2021: 88 ML/MIN/1.73M2
EOSINOPHIL # BLD AUTO: 0.2 10E3/UL (ref 0–0.7)
EOSINOPHIL NFR BLD AUTO: 2 %
ERYTHROCYTE [DISTWIDTH] IN BLOOD BY AUTOMATED COUNT: 12.8 % (ref 10–15)
GLUCOSE SERPL-MCNC: 100 MG/DL (ref 70–99)
GLUCOSE UR STRIP-MCNC: NEGATIVE MG/DL
HCO3 SERPL-SCNC: 23 MMOL/L (ref 22–29)
HCT VFR BLD AUTO: 48.1 % (ref 40–53)
HGB BLD-MCNC: 16.5 G/DL (ref 13.3–17.7)
HGB UR QL STRIP: NEGATIVE
HOLD SPECIMEN: NORMAL
HOLD SPECIMEN: NORMAL
IMM GRANULOCYTES # BLD: 0.1 10E3/UL
IMM GRANULOCYTES NFR BLD: 1 %
KETONES UR STRIP-MCNC: NEGATIVE MG/DL
LEUKOCYTE ESTERASE UR QL STRIP: ABNORMAL
LYMPHOCYTES # BLD AUTO: 1.6 10E3/UL (ref 0.8–5.3)
LYMPHOCYTES NFR BLD AUTO: 16 %
MCH RBC QN AUTO: 28.9 PG (ref 26.5–33)
MCHC RBC AUTO-ENTMCNC: 34.3 G/DL (ref 31.5–36.5)
MCV RBC AUTO: 84 FL (ref 78–100)
MONOCYTES # BLD AUTO: 0.9 10E3/UL (ref 0–1.3)
MONOCYTES NFR BLD AUTO: 9 %
MUCOUS THREADS #/AREA URNS LPF: PRESENT /LPF
NEUTROPHILS # BLD AUTO: 7.3 10E3/UL (ref 1.6–8.3)
NEUTROPHILS NFR BLD AUTO: 73 %
NITRATE UR QL: NEGATIVE
NRBC # BLD AUTO: 0 10E3/UL
NRBC BLD AUTO-RTO: 0 /100
PH UR STRIP: 6 [PH] (ref 5–7)
PLATELET # BLD AUTO: 146 10E3/UL (ref 150–450)
POTASSIUM SERPL-SCNC: 3.8 MMOL/L (ref 3.4–5.3)
PROT SERPL-MCNC: 7.7 G/DL (ref 6.4–8.3)
RBC # BLD AUTO: 5.71 10E6/UL (ref 4.4–5.9)
RBC URINE: 1 /HPF
SODIUM SERPL-SCNC: 138 MMOL/L (ref 135–145)
SP GR UR STRIP: 1.02 (ref 1–1.03)
SQUAMOUS EPITHELIAL: 1 /HPF
UROBILINOGEN UR STRIP-MCNC: NORMAL MG/DL
WBC # BLD AUTO: 10 10E3/UL (ref 4–11)
WBC URINE: 5 /HPF

## 2024-07-24 PROCEDURE — 250N000011 HC RX IP 250 OP 636: Performed by: EMERGENCY MEDICINE

## 2024-07-24 PROCEDURE — 81001 URINALYSIS AUTO W/SCOPE: CPT | Performed by: EMERGENCY MEDICINE

## 2024-07-24 PROCEDURE — 74177 CT ABD & PELVIS W/CONTRAST: CPT

## 2024-07-24 PROCEDURE — 99214 OFFICE O/P EST MOD 30 MIN: CPT | Performed by: PHYSICIAN ASSISTANT

## 2024-07-24 PROCEDURE — 36415 COLL VENOUS BLD VENIPUNCTURE: CPT | Performed by: EMERGENCY MEDICINE

## 2024-07-24 PROCEDURE — G0378 HOSPITAL OBSERVATION PER HR: HCPCS

## 2024-07-24 PROCEDURE — 99285 EMERGENCY DEPT VISIT HI MDM: CPT | Mod: 25

## 2024-07-24 PROCEDURE — 82040 ASSAY OF SERUM ALBUMIN: CPT | Performed by: EMERGENCY MEDICINE

## 2024-07-24 PROCEDURE — 250N000011 HC RX IP 250 OP 636: Performed by: SURGERY

## 2024-07-24 PROCEDURE — 96376 TX/PRO/DX INJ SAME DRUG ADON: CPT

## 2024-07-24 PROCEDURE — 96365 THER/PROPH/DIAG IV INF INIT: CPT

## 2024-07-24 PROCEDURE — 258N000003 HC RX IP 258 OP 636: Performed by: SURGERY

## 2024-07-24 PROCEDURE — 85025 COMPLETE CBC W/AUTO DIFF WBC: CPT | Performed by: EMERGENCY MEDICINE

## 2024-07-24 RX ORDER — AMOXICILLIN 250 MG
2 CAPSULE ORAL 2 TIMES DAILY PRN
Status: DISCONTINUED | OUTPATIENT
Start: 2024-07-24 | End: 2024-07-25 | Stop reason: HOSPADM

## 2024-07-24 RX ORDER — AMOXICILLIN 250 MG
1 CAPSULE ORAL 2 TIMES DAILY PRN
Status: DISCONTINUED | OUTPATIENT
Start: 2024-07-24 | End: 2024-07-25 | Stop reason: HOSPADM

## 2024-07-24 RX ORDER — DIPHENHYDRAMINE HYDROCHLORIDE 50 MG/ML
25 INJECTION INTRAMUSCULAR; INTRAVENOUS EVERY 6 HOURS PRN
Status: DISCONTINUED | OUTPATIENT
Start: 2024-07-24 | End: 2024-07-25 | Stop reason: HOSPADM

## 2024-07-24 RX ORDER — ACETAMINOPHEN 325 MG/1
650 TABLET ORAL EVERY 4 HOURS PRN
Status: DISCONTINUED | OUTPATIENT
Start: 2024-07-27 | End: 2024-07-25 | Stop reason: HOSPADM

## 2024-07-24 RX ORDER — DIPHENHYDRAMINE HCL 25 MG
25 CAPSULE ORAL EVERY 6 HOURS PRN
Status: DISCONTINUED | OUTPATIENT
Start: 2024-07-24 | End: 2024-07-25 | Stop reason: HOSPADM

## 2024-07-24 RX ORDER — HYDROMORPHONE HCL IN WATER/PF 6 MG/30 ML
0.2 PATIENT CONTROLLED ANALGESIA SYRINGE INTRAVENOUS
Status: DISCONTINUED | OUTPATIENT
Start: 2024-07-24 | End: 2024-07-25 | Stop reason: HOSPADM

## 2024-07-24 RX ORDER — POLYETHYLENE GLYCOL 3350 17 G/17G
17 POWDER, FOR SOLUTION ORAL 2 TIMES DAILY PRN
Status: DISCONTINUED | OUTPATIENT
Start: 2024-07-24 | End: 2024-07-25 | Stop reason: HOSPADM

## 2024-07-24 RX ORDER — PROCHLORPERAZINE MALEATE 5 MG
10 TABLET ORAL EVERY 6 HOURS PRN
Status: DISCONTINUED | OUTPATIENT
Start: 2024-07-24 | End: 2024-07-25 | Stop reason: HOSPADM

## 2024-07-24 RX ORDER — PIPERACILLIN SODIUM, TAZOBACTAM SODIUM 3; .375 G/15ML; G/15ML
3.38 INJECTION, POWDER, LYOPHILIZED, FOR SOLUTION INTRAVENOUS EVERY 6 HOURS
Status: DISCONTINUED | OUTPATIENT
Start: 2024-07-25 | End: 2024-07-25 | Stop reason: HOSPADM

## 2024-07-24 RX ORDER — OXYCODONE HYDROCHLORIDE 5 MG/1
10 TABLET ORAL EVERY 4 HOURS PRN
Status: DISCONTINUED | OUTPATIENT
Start: 2024-07-24 | End: 2024-07-25 | Stop reason: HOSPADM

## 2024-07-24 RX ORDER — ROSUVASTATIN CALCIUM 20 MG/1
20 TABLET, COATED ORAL AT BEDTIME
COMMUNITY
End: 2024-09-09

## 2024-07-24 RX ORDER — ONDANSETRON 2 MG/ML
4 INJECTION INTRAMUSCULAR; INTRAVENOUS EVERY 6 HOURS PRN
Status: DISCONTINUED | OUTPATIENT
Start: 2024-07-24 | End: 2024-07-25 | Stop reason: HOSPADM

## 2024-07-24 RX ORDER — ONDANSETRON 2 MG/ML
4 INJECTION INTRAMUSCULAR; INTRAVENOUS EVERY 6 HOURS PRN
Status: DISCONTINUED | OUTPATIENT
Start: 2024-07-24 | End: 2024-07-24

## 2024-07-24 RX ORDER — NALOXONE HYDROCHLORIDE 0.4 MG/ML
0.4 INJECTION, SOLUTION INTRAMUSCULAR; INTRAVENOUS; SUBCUTANEOUS
Status: DISCONTINUED | OUTPATIENT
Start: 2024-07-24 | End: 2024-07-25 | Stop reason: HOSPADM

## 2024-07-24 RX ORDER — NALOXONE HYDROCHLORIDE 0.4 MG/ML
0.2 INJECTION, SOLUTION INTRAMUSCULAR; INTRAVENOUS; SUBCUTANEOUS
Status: DISCONTINUED | OUTPATIENT
Start: 2024-07-24 | End: 2024-07-25 | Stop reason: HOSPADM

## 2024-07-24 RX ORDER — PROCHLORPERAZINE 25 MG
25 SUPPOSITORY, RECTAL RECTAL EVERY 12 HOURS PRN
Status: DISCONTINUED | OUTPATIENT
Start: 2024-07-24 | End: 2024-07-25 | Stop reason: HOSPADM

## 2024-07-24 RX ORDER — SODIUM CHLORIDE, SODIUM LACTATE, POTASSIUM CHLORIDE, CALCIUM CHLORIDE 600; 310; 30; 20 MG/100ML; MG/100ML; MG/100ML; MG/100ML
INJECTION, SOLUTION INTRAVENOUS CONTINUOUS
Status: DISCONTINUED | OUTPATIENT
Start: 2024-07-24 | End: 2024-07-25 | Stop reason: HOSPADM

## 2024-07-24 RX ORDER — OXYCODONE HYDROCHLORIDE 5 MG/1
5 TABLET ORAL EVERY 4 HOURS PRN
Status: DISCONTINUED | OUTPATIENT
Start: 2024-07-24 | End: 2024-07-25 | Stop reason: HOSPADM

## 2024-07-24 RX ORDER — PROCHLORPERAZINE MALEATE 5 MG
10 TABLET ORAL EVERY 6 HOURS PRN
Status: DISCONTINUED | OUTPATIENT
Start: 2024-07-24 | End: 2024-07-24

## 2024-07-24 RX ORDER — IOPAMIDOL 755 MG/ML
500 INJECTION, SOLUTION INTRAVASCULAR ONCE
Status: COMPLETED | OUTPATIENT
Start: 2024-07-24 | End: 2024-07-24

## 2024-07-24 RX ORDER — ONDANSETRON 4 MG/1
4 TABLET, ORALLY DISINTEGRATING ORAL EVERY 6 HOURS PRN
Status: DISCONTINUED | OUTPATIENT
Start: 2024-07-24 | End: 2024-07-24

## 2024-07-24 RX ORDER — ONDANSETRON 4 MG/1
4 TABLET, ORALLY DISINTEGRATING ORAL EVERY 6 HOURS PRN
Status: DISCONTINUED | OUTPATIENT
Start: 2024-07-24 | End: 2024-07-25 | Stop reason: HOSPADM

## 2024-07-24 RX ORDER — PIPERACILLIN SODIUM, TAZOBACTAM SODIUM 4; .5 G/20ML; G/20ML
4.5 INJECTION, POWDER, LYOPHILIZED, FOR SOLUTION INTRAVENOUS ONCE
Status: COMPLETED | OUTPATIENT
Start: 2024-07-24 | End: 2024-07-24

## 2024-07-24 RX ORDER — HYDROMORPHONE HCL IN WATER/PF 6 MG/30 ML
0.4 PATIENT CONTROLLED ANALGESIA SYRINGE INTRAVENOUS
Status: DISCONTINUED | OUTPATIENT
Start: 2024-07-24 | End: 2024-07-25 | Stop reason: HOSPADM

## 2024-07-24 RX ADMIN — PIPERACILLIN AND TAZOBACTAM 3.38 G: 3; .375 INJECTION, POWDER, FOR SOLUTION INTRAVENOUS at 23:54

## 2024-07-24 RX ADMIN — SODIUM CHLORIDE, POTASSIUM CHLORIDE, SODIUM LACTATE AND CALCIUM CHLORIDE: 600; 310; 30; 20 INJECTION, SOLUTION INTRAVENOUS at 21:07

## 2024-07-24 RX ADMIN — IOPAMIDOL 100 ML: 755 INJECTION, SOLUTION INTRAVENOUS at 17:58

## 2024-07-24 RX ADMIN — PIPERACILLIN AND TAZOBACTAM 4.5 G: 4; .5 INJECTION, POWDER, FOR SOLUTION INTRAVENOUS at 18:26

## 2024-07-24 ASSESSMENT — ACTIVITIES OF DAILY LIVING (ADL)
ADLS_ACUITY_SCORE: 35
ADLS_ACUITY_SCORE: 31

## 2024-07-24 ASSESSMENT — COLUMBIA-SUICIDE SEVERITY RATING SCALE - C-SSRS
6. HAVE YOU EVER DONE ANYTHING, STARTED TO DO ANYTHING, OR PREPARED TO DO ANYTHING TO END YOUR LIFE?: NO
2. HAVE YOU ACTUALLY HAD ANY THOUGHTS OF KILLING YOURSELF IN THE PAST MONTH?: NO
1. IN THE PAST MONTH, HAVE YOU WISHED YOU WERE DEAD OR WISHED YOU COULD GO TO SLEEP AND NOT WAKE UP?: NO

## 2024-07-24 NOTE — ED TRIAGE NOTES
Pt reports that he was seen at urgent care in [place] and they sent him here for abd pain that is in umbilical area and radiates right.  Pt denies fever, nausea, vomiting and diarrhea.       Triage Assessment (Adult)       Row Name 07/24/24 8574          Triage Assessment    Airway WDL WDL        Respiratory WDL    Respiratory WDL WDL        Skin Circulation/Temperature WDL    Skin Circulation/Temperature WDL WDL        Cardiac WDL    Cardiac WDL WDL        Peripheral/Neurovascular WDL    Peripheral Neurovascular WDL WDL        Cognitive/Neuro/Behavioral WDL    Cognitive/Neuro/Behavioral WDL WDL

## 2024-07-24 NOTE — PROGRESS NOTES
Assessment & Plan     Abdominal pain, right lower quadrant  Ddx: Appendicitis, Diverticulitis, UTI, Pyelonephritis, Kidney Stone, Bowel Obstruction, Pancreatitis, Hepatitis, etc...    Acute problem since yesterday.  Pain started in the periumbilical region and gradually moved to the right lower quadrant.  Pain is  constant.  Moderate tenderness right lower quadrant noted on exam.  Vitals are stable.  Patient is nontoxic-appearing.  Unfortunately it is late during the day to be seen at the [facility].  Given limited diagnostic imaging and lab capabilities in the urgent care setting, recommend further evaluation in the ED to rule out acute appendicitis versus other intra-abdominal abnormalities.  [facility] ED notified.  Transfer via private car.  Patient agrees to the plan.       Return today (on 7/24/2024) for follow up in the ED [facility].    Alyssa Jackson PA-C  [facility] Baldwin is a 48 year old male who presents to clinic today for the following health issues:  Chief Complaint   Patient presents with    Urgent Care     Yesterday stomach pain, 4 pm yesterday intense abdominal pain around belly button, pain decreased around 2 am, having lower right side abdominal pain today, worse with cough, no constipation, nausea last night, chills,   No pain meds taking        HPI    Abdominal Pain    Location: periumbilical, RLQ   Radiation: None.    Pain character: aching,   Severity: 6 on a scale of 1-10.    Duration: 2 day(s)   Course of Illness: stable  Exacerbated by: cough/deep breath  Relieved by: nothing.  Associated Symptoms: nausea and chills  Denies: vomiting, diarrhea, melena, dysuria, frequency, hematuria, and myalgias  Surgical History: none  Patient is headed out of town tomorrow, reason which prompted his visit [facility].  Patient reports normal bowel movement today.      Review of Systems  Constitutional, HEENT, cardiovascular, pulmonary, GI, , musculoskeletal,  neuro, skin, endocrine and psych systems are negative, except as otherwise noted.      Objective    BP (!) 139/94   Pulse 81   Temp 99.4  F (37.4  C) (Tympanic)   Wt 89.8 kg (198 lb)   SpO2 97%   BMI 29.24 kg/m    Physical Exam   GENERAL: alert and no distress  RESP: lungs clear to auscultation - no rales, rhonchi or wheezes  CV: regular rate and rhythm, normal S1 S2  ABDOMEN: soft, moderate tenderness RLQ, no hepatosplenomegaly, no masses and bowel sounds normal  MS: no gross musculoskeletal defects noted, no edema

## 2024-07-24 NOTE — PATIENT INSTRUCTIONS
RLQ abdominal pain x 2 days. Low grade temp here.    Given limited diagnostic imaging and lab capabilities in the urgent care setting, recommend further evaluation in the ED    DDx: acute appendicitis, epiploic appendagitis, colitis, etc....

## 2024-07-24 NOTE — ED NOTES
"[facility]  ED Nurse Handoff Report    ED Chief complaint: Abdominal Pain  . ED Diagnosis:   Final diagnoses:   Acute appendicitis with localized peritonitis, without perforation, abscess, or gangrene   Right lower quadrant pain       Allergies:   Allergies   Allergen Reactions    Ibuprofen Hives     Rash/hives on face       Code Status: Full Code    Activity level - Baseline/Home:  independent.  Activity Level - Current:   independent.   Lift room needed: No.   Bariatric: No   Needed: No   Isolation: No.   Infection: Not Applicable.     Respiratory status: Room air    Vital Signs (within 30 minutes):   Vitals:    07/24/24 1709 07/24/24 1825   BP: (!) 145/94    Pulse: 77    Resp: 18    Temp: 98.7  F (37.1  C)    SpO2: 98%    Weight: 91.3 kg (201 lb 4.5 oz)    Height:  1.753 m (5' 9\")       Cardiac Rhythm:  ,      Pain level:    Patient confused: No.   Patient Falls Risk: nonskid shoes/slippers when out of bed.   Elimination Status: Has voided     Patient Report - Initial Complaint: resents to the ED for evaluation of abdominal pain. The patient states that he developed central abdominal pain yesterday and went to . The pain has now moved to his right lower quadrant, he had some nausea and chills last night. The patient denies dysuria, hematuria, chills, fever, or history of kidney stones. .   Focused Assessment: Pt reports that he was seen at urgent care in [place] and they sent him here for abd pain that is in umbilical area and radiates right.  Pt denies fever, nausea, vomiting and diarrhea.       Abnormal Results:             CT Abdomen Pelvis w Contrast   Final Result   IMPRESSION:    1.  Early acute appendicitis.          Treatments provided: See MAR  Family Comments: wife at bedside  OBS brochure/video discussed/provided to patient:  Yes  ED Medications:   Medications   piperacillin-tazobactam (ZOSYN) 4.5 g vial to attach to  mL bag (4.5 g Intravenous $New Bag " 7/24/24 9916)   sodium chloride (PF) 0.9% PF flush 100 mL (65 mLs Intravenous $Given 7/24/24 1758)   iopamidol (ISOVUE-370) solution 500 mL (100 mLs Intravenous $Given 7/24/24 1758)       Drips infusing:  No  For the majority of the shift this patient was Green.   Interventions performed were NA.    Sepsis treatment initiated: No    Cares/treatment/interventions/medications to be completed following ED care: See orders    ED Nurse Name: Gissell Valdivia RN  6:36 PM     RECEIVING UNIT ED HANDOFF REVIEW    Above ED Nurse Handoff Report was reviewed: Yes  Reviewed by: Zhane Childers RN on July 24, 2024 at 7:47 PM   I Brigette called the ED to inform them the note was read: Yes

## 2024-07-24 NOTE — ED PROVIDER NOTES
"  Emergency Department Note      History of Present Illness     Chief Complaint   Abdominal Pain      HPI   Denny Jackson is a 48 year old male with who presents to the ED for evaluation of abdominal pain. The patient states that he developed central abdominal pain yesterday and went to . The pain has now moved to his right lower quadrant, he had some nausea and chills last night. The patient denies dysuria, hematuria, chills, fever, or history of kidney stones.    Independent Historian   None    Review of External Notes   Reviewed patient's office visit from earlier today at urgent care.  Patient had periumbilical pain yesterday, and it has now moved to the right lower quadrant.  Patient advised to go to the emergency department for CT scan.    Past Medical History     Medical History and Problem List   Right inguinal hernia   Mixed hyperlipidemia   Hypercholesteremia     Medications   Crestor     Surgical History   Past Surgical History:   Procedure Laterality Date    VASECTOMY       Physical Exam     Patient Vitals for the past 24 hrs:   BP Temp Pulse Resp SpO2 Height Weight   07/24/24 1825 -- -- -- -- -- 1.753 m (5' 9\") --   07/24/24 1709 (!) 145/94 98.7  F (37.1  C) 77 18 98 % -- 91.3 kg (201 lb 4.5 oz)     Physical Exam  General: Well-nourished, resting comfortably when I enter the room  Eyes: Pupils equal, conjunctivae pink no scleral icterus or conjunctival injection  ENT:  Moist mucus membranes  Respiratory:  Lungs clear to auscultation bilaterally, no crackles/rubs/wheezes.  Good air movement  CV: Normal rate and rhythm, no murmurs  GI:  Abdomen soft and non-distended.  No guarding or rebound.  Tenderness to palpation in the right lower quadrant.  Skin: Warm, dry.  No rashes or petechiae  Musculoskeletal: No peripheral edema or calf tenderness  Neuro: Alert and oriented to person/place/time  Psychiatric: Normal affect    Diagnostics     Lab Results   Labs Ordered and Resulted from Time of ED Arrival to " Time of ED Departure   ROUTINE UA WITH MICROSCOPIC REFLEX TO CULTURE - Abnormal       Result Value    Color Urine Yellow      Appearance Urine Clear      Glucose Urine Negative      Bilirubin Urine Negative      Ketones Urine Negative      Specific Gravity Urine 1.023      Blood Urine Negative      pH Urine 6.0      Protein Albumin Urine Negative      Urobilinogen Urine Normal      Nitrite Urine Negative      Leukocyte Esterase Urine Trace (*)     Mucus Urine Present (*)     RBC Urine 1      WBC Urine 5      Squamous Epithelials Urine 1     COMPREHENSIVE METABOLIC PANEL - Abnormal    Sodium 138      Potassium 3.8      Carbon Dioxide (CO2) 23      Anion Gap 16 (*)     Urea Nitrogen 17.1      Creatinine 1.05      GFR Estimate 88      Calcium 9.1      Chloride 99      Glucose 100 (*)     Alkaline Phosphatase 51      AST 22      ALT 46      Protein Total 7.7      Albumin 4.8      Bilirubin Total 1.1     CBC WITH PLATELETS AND DIFFERENTIAL - Abnormal    WBC Count 10.0      RBC Count 5.71      Hemoglobin 16.5      Hematocrit 48.1      MCV 84      MCH 28.9      MCHC 34.3      RDW 12.8      Platelet Count 146 (*)     % Neutrophils 73      % Lymphocytes 16      % Monocytes 9      % Eosinophils 2      % Basophils 0      % Immature Granulocytes 1      NRBCs per 100 WBC 0      Absolute Neutrophils 7.3      Absolute Lymphocytes 1.6      Absolute Monocytes 0.9      Absolute Eosinophils 0.2      Absolute Basophils 0.0      Absolute Immature Granulocytes 0.1      Absolute NRBCs 0.0       Imaging   CT Abdomen Pelvis w Contrast   Final Result   IMPRESSION:    1.  Early acute appendicitis.        Independent Interpretation   None    ED Course      Medications Administered   Medications   piperacillin-tazobactam (ZOSYN) 4.5 g vial to attach to  mL bag (4.5 g Intravenous $New Bag 7/24/24 1826)   sodium chloride (PF) 0.9% PF flush 100 mL (65 mLs Intravenous $Given 7/24/24 2825)   iopamidol (ISOVUE-370) solution 500 mL (100 mLs  Intravenous $Given 7/24/24 1754)     Procedures   Procedures     Discussion of Management   Surgery,     ED Course   ED Course as of 07/24/24 1836   Wed Jul 24, 2024   1717 I obtained history and examined the patient as noted above.    1821 I spoke to Dr. Ni Thapa from general surgery.   1823 I updated the patient regarding his diagnosis.     Optional/Additional Documentation  None    Medical Decision Making / Diagnosis     CMS Diagnoses: None    MIPS       None    [facility]   Denny Jackson is a 48 year old male with a history of hyperlipidemia presents to the emergency department with a complaint of right lower quadrant pain.  Patient reports that it started in the periumbilical region yesterday.  It is worse when he coughs.  Otherwise the pain is manageable.  He has not had any fevers, diarrhea, vomiting.  The patient did eat 1 hour prior to arrival to the emergency department.  On exam, patient has tenderness to palpation in the right lower quadrant.  Workup does not show a leukocytosis, no signs of urinary tract infection.  CT scan does show acute appendicitis without any complicating factors..  No signs of bowel obstruction or kidney stone.  On reevaluation, patient is informed of his results.  All questions are answered.  He is still refusing any pain medication. Zosyn is ordered.  I spoke with Dr. Thapa with general surgery, she will admit the patient for surgery tomorrow.    Disposition   The patient was admitted to the hospital.     Diagnosis     ICD-10-CM    1. Acute appendicitis with localized peritonitis, without perforation, abscess, or gangrene  K35.30 Case Request: APPENDECTOMY, LAPAROSCOPIC     Case Request: APPENDECTOMY, LAPAROSCOPIC      2. Right lower quadrant pain  R10.31          Discharge Medications   New Prescriptions    No medications on file     I, Catalina Hewitt am serving as a scribe at 5:17 PM on 7/24/2024 to document services personally performed by Catalina Middleton MD  based on my observations and the provider's statements to me.        Catalina Middleton MD  07/24/24 7528

## 2024-07-24 NOTE — PROGRESS NOTES
Discussed with Dr Middleton in the ED  Acute appendicitis on CT.   Patient last had solid food after 4pm so he will be admitted with plan for surgery tomorrow, NPO at midnight  Ilene Thapa MD

## 2024-07-25 ENCOUNTER — ANESTHESIA EVENT (OUTPATIENT)
Dept: SURGERY | Facility: CLINIC | Age: 49
End: 2024-07-25
Payer: COMMERCIAL

## 2024-07-25 ENCOUNTER — APPOINTMENT (OUTPATIENT)
Dept: SURGERY | Facility: PHYSICIAN GROUP | Age: 49
End: 2024-07-25
Payer: COMMERCIAL

## 2024-07-25 ENCOUNTER — ANESTHESIA (OUTPATIENT)
Dept: SURGERY | Facility: CLINIC | Age: 49
End: 2024-07-25
Payer: COMMERCIAL

## 2024-07-25 VITALS
OXYGEN SATURATION: 95 % | WEIGHT: 197.7 LBS | SYSTOLIC BLOOD PRESSURE: 139 MMHG | BODY MASS INDEX: 29.28 KG/M2 | HEART RATE: 73 BPM | RESPIRATION RATE: 14 BRPM | HEIGHT: 69 IN | TEMPERATURE: 97 F | DIASTOLIC BLOOD PRESSURE: 84 MMHG

## 2024-07-25 LAB
ERYTHROCYTE [DISTWIDTH] IN BLOOD BY AUTOMATED COUNT: 12.8 % (ref 10–15)
HCT VFR BLD AUTO: 46.3 % (ref 40–53)
HGB BLD-MCNC: 15.5 G/DL (ref 13.3–17.7)
MCH RBC QN AUTO: 29.1 PG (ref 26.5–33)
MCHC RBC AUTO-ENTMCNC: 33.5 G/DL (ref 31.5–36.5)
MCV RBC AUTO: 87 FL (ref 78–100)
PLATELET # BLD AUTO: 141 10E3/UL (ref 150–450)
RBC # BLD AUTO: 5.33 10E6/UL (ref 4.4–5.9)
WBC # BLD AUTO: 7.8 10E3/UL (ref 4–11)

## 2024-07-25 PROCEDURE — 710N000012 HC RECOVERY PHASE 2, PER MINUTE: Performed by: SURGERY

## 2024-07-25 PROCEDURE — 250N000011 HC RX IP 250 OP 636: Performed by: SURGERY

## 2024-07-25 PROCEDURE — 250N000011 HC RX IP 250 OP 636: Performed by: ANESTHESIOLOGY

## 2024-07-25 PROCEDURE — 272N000001 HC OR GENERAL SUPPLY STERILE: Performed by: SURGERY

## 2024-07-25 PROCEDURE — 258N000001 HC RX 258: Performed by: SURGERY

## 2024-07-25 PROCEDURE — 250N000011 HC RX IP 250 OP 636: Performed by: NURSE ANESTHETIST, CERTIFIED REGISTERED

## 2024-07-25 PROCEDURE — 360N000076 HC SURGERY LEVEL 3, PER MIN: Performed by: SURGERY

## 2024-07-25 PROCEDURE — 99214 OFFICE O/P EST MOD 30 MIN: CPT | Mod: 57 | Performed by: SURGERY

## 2024-07-25 PROCEDURE — 85027 COMPLETE CBC AUTOMATED: CPT | Performed by: SURGERY

## 2024-07-25 PROCEDURE — 88304 TISSUE EXAM BY PATHOLOGIST: CPT | Mod: TC | Performed by: SURGERY

## 2024-07-25 PROCEDURE — 370N000017 HC ANESTHESIA TECHNICAL FEE, PER MIN: Performed by: SURGERY

## 2024-07-25 PROCEDURE — 710N000009 HC RECOVERY PHASE 1, LEVEL 1, PER MIN: Performed by: SURGERY

## 2024-07-25 PROCEDURE — 250N000013 HC RX MED GY IP 250 OP 250 PS 637: Performed by: ANESTHESIOLOGY

## 2024-07-25 PROCEDURE — G0378 HOSPITAL OBSERVATION PER HR: HCPCS

## 2024-07-25 PROCEDURE — 250N000025 HC SEVOFLURANE, PER MIN: Performed by: SURGERY

## 2024-07-25 PROCEDURE — 250N000009 HC RX 250: Performed by: NURSE ANESTHETIST, CERTIFIED REGISTERED

## 2024-07-25 PROCEDURE — 258N000003 HC RX IP 258 OP 636: Performed by: ANESTHESIOLOGY

## 2024-07-25 PROCEDURE — 250N000013 HC RX MED GY IP 250 OP 250 PS 637: Performed by: SURGERY

## 2024-07-25 PROCEDURE — 36415 COLL VENOUS BLD VENIPUNCTURE: CPT | Performed by: SURGERY

## 2024-07-25 PROCEDURE — 44970 LAPAROSCOPY APPENDECTOMY: CPT | Performed by: SURGERY

## 2024-07-25 PROCEDURE — 999N000141 HC STATISTIC PRE-PROCEDURE NURSING ASSESSMENT: Performed by: SURGERY

## 2024-07-25 RX ORDER — MEPERIDINE HYDROCHLORIDE 25 MG/ML
12.5 INJECTION INTRAMUSCULAR; INTRAVENOUS; SUBCUTANEOUS EVERY 5 MIN PRN
Status: DISCONTINUED | OUTPATIENT
Start: 2024-07-25 | End: 2024-07-25 | Stop reason: HOSPADM

## 2024-07-25 RX ORDER — ONDANSETRON 4 MG/1
4 TABLET, ORALLY DISINTEGRATING ORAL EVERY 30 MIN PRN
Status: DISCONTINUED | OUTPATIENT
Start: 2024-07-25 | End: 2024-07-25 | Stop reason: HOSPADM

## 2024-07-25 RX ORDER — DIAZEPAM 10 MG/2ML
2.5 INJECTION, SOLUTION INTRAMUSCULAR; INTRAVENOUS
Status: DISCONTINUED | OUTPATIENT
Start: 2024-07-25 | End: 2024-07-25 | Stop reason: HOSPADM

## 2024-07-25 RX ORDER — SODIUM CHLORIDE, SODIUM LACTATE, POTASSIUM CHLORIDE, CALCIUM CHLORIDE 600; 310; 30; 20 MG/100ML; MG/100ML; MG/100ML; MG/100ML
INJECTION, SOLUTION INTRAVENOUS CONTINUOUS
Status: DISCONTINUED | OUTPATIENT
Start: 2024-07-25 | End: 2024-07-25 | Stop reason: HOSPADM

## 2024-07-25 RX ORDER — HYDROMORPHONE HCL IN WATER/PF 6 MG/30 ML
0.2 PATIENT CONTROLLED ANALGESIA SYRINGE INTRAVENOUS EVERY 5 MIN PRN
Status: DISCONTINUED | OUTPATIENT
Start: 2024-07-25 | End: 2024-07-25 | Stop reason: HOSPADM

## 2024-07-25 RX ORDER — PROPOFOL 10 MG/ML
INJECTION, EMULSION INTRAVENOUS CONTINUOUS PRN
Status: DISCONTINUED | OUTPATIENT
Start: 2024-07-25 | End: 2024-07-25

## 2024-07-25 RX ORDER — DEXAMETHASONE SODIUM PHOSPHATE 4 MG/ML
INJECTION, SOLUTION INTRA-ARTICULAR; INTRALESIONAL; INTRAMUSCULAR; INTRAVENOUS; SOFT TISSUE PRN
Status: DISCONTINUED | OUTPATIENT
Start: 2024-07-25 | End: 2024-07-25

## 2024-07-25 RX ORDER — ONDANSETRON 2 MG/ML
4 INJECTION INTRAMUSCULAR; INTRAVENOUS EVERY 30 MIN PRN
Status: DISCONTINUED | OUTPATIENT
Start: 2024-07-25 | End: 2024-07-25 | Stop reason: HOSPADM

## 2024-07-25 RX ORDER — ALBUTEROL SULFATE 0.83 MG/ML
2.5 SOLUTION RESPIRATORY (INHALATION) EVERY 4 HOURS PRN
Status: DISCONTINUED | OUTPATIENT
Start: 2024-07-25 | End: 2024-07-25 | Stop reason: HOSPADM

## 2024-07-25 RX ORDER — BUPIVACAINE HYDROCHLORIDE 5 MG/ML
INJECTION, SOLUTION EPIDURAL; INTRACAUDAL PRN
Status: DISCONTINUED | OUTPATIENT
Start: 2024-07-25 | End: 2024-07-25 | Stop reason: HOSPADM

## 2024-07-25 RX ORDER — ONDANSETRON 2 MG/ML
INJECTION INTRAMUSCULAR; INTRAVENOUS PRN
Status: DISCONTINUED | OUTPATIENT
Start: 2024-07-25 | End: 2024-07-25

## 2024-07-25 RX ORDER — LABETALOL HYDROCHLORIDE 5 MG/ML
10 INJECTION, SOLUTION INTRAVENOUS EVERY 10 MIN PRN
Status: DISCONTINUED | OUTPATIENT
Start: 2024-07-25 | End: 2024-07-25 | Stop reason: HOSPADM

## 2024-07-25 RX ORDER — OXYCODONE HYDROCHLORIDE 5 MG/1
5 TABLET ORAL EVERY 4 HOURS PRN
Status: DISCONTINUED | OUTPATIENT
Start: 2024-07-25 | End: 2024-07-25 | Stop reason: HOSPADM

## 2024-07-25 RX ORDER — CEFAZOLIN SODIUM/WATER 2 G/20 ML
2 SYRINGE (ML) INTRAVENOUS
Status: COMPLETED | OUTPATIENT
Start: 2024-07-25 | End: 2024-07-25

## 2024-07-25 RX ORDER — HYDROMORPHONE HCL IN WATER/PF 6 MG/30 ML
0.4 PATIENT CONTROLLED ANALGESIA SYRINGE INTRAVENOUS EVERY 5 MIN PRN
Status: DISCONTINUED | OUTPATIENT
Start: 2024-07-25 | End: 2024-07-25 | Stop reason: HOSPADM

## 2024-07-25 RX ORDER — NALOXONE HYDROCHLORIDE 0.4 MG/ML
0.1 INJECTION, SOLUTION INTRAMUSCULAR; INTRAVENOUS; SUBCUTANEOUS
Status: DISCONTINUED | OUTPATIENT
Start: 2024-07-25 | End: 2024-07-25 | Stop reason: HOSPADM

## 2024-07-25 RX ORDER — OXYCODONE HYDROCHLORIDE 5 MG/1
5-10 TABLET ORAL EVERY 4 HOURS PRN
Qty: 12 TABLET | Refills: 0 | Status: SHIPPED | OUTPATIENT
Start: 2024-07-25 | End: 2024-07-25

## 2024-07-25 RX ORDER — OXYCODONE HYDROCHLORIDE 5 MG/1
5-10 TABLET ORAL EVERY 4 HOURS PRN
Qty: 12 TABLET | Refills: 0 | Status: SHIPPED | OUTPATIENT
Start: 2024-07-25 | End: 2024-09-09

## 2024-07-25 RX ORDER — LIDOCAINE HYDROCHLORIDE 20 MG/ML
INJECTION, SOLUTION INFILTRATION; PERINEURAL PRN
Status: DISCONTINUED | OUTPATIENT
Start: 2024-07-25 | End: 2024-07-25

## 2024-07-25 RX ORDER — FENTANYL CITRATE 50 UG/ML
INJECTION, SOLUTION INTRAMUSCULAR; INTRAVENOUS PRN
Status: DISCONTINUED | OUTPATIENT
Start: 2024-07-25 | End: 2024-07-25

## 2024-07-25 RX ORDER — OXYCODONE HYDROCHLORIDE 5 MG/1
5 TABLET ORAL
Status: COMPLETED | OUTPATIENT
Start: 2024-07-25 | End: 2024-07-25

## 2024-07-25 RX ORDER — DEXAMETHASONE SODIUM PHOSPHATE 4 MG/ML
4 INJECTION, SOLUTION INTRA-ARTICULAR; INTRALESIONAL; INTRAMUSCULAR; INTRAVENOUS; SOFT TISSUE
Status: DISCONTINUED | OUTPATIENT
Start: 2024-07-25 | End: 2024-07-25 | Stop reason: HOSPADM

## 2024-07-25 RX ORDER — FENTANYL CITRATE 50 UG/ML
25 INJECTION, SOLUTION INTRAMUSCULAR; INTRAVENOUS
Status: DISCONTINUED | OUTPATIENT
Start: 2024-07-25 | End: 2024-07-25 | Stop reason: HOSPADM

## 2024-07-25 RX ORDER — PROPOFOL 10 MG/ML
INJECTION, EMULSION INTRAVENOUS PRN
Status: DISCONTINUED | OUTPATIENT
Start: 2024-07-25 | End: 2024-07-25

## 2024-07-25 RX ORDER — ACETAMINOPHEN 325 MG/1
650 TABLET ORAL
Status: DISCONTINUED | OUTPATIENT
Start: 2024-07-25 | End: 2024-07-25 | Stop reason: HOSPADM

## 2024-07-25 RX ORDER — FENTANYL CITRATE 50 UG/ML
50 INJECTION, SOLUTION INTRAMUSCULAR; INTRAVENOUS EVERY 5 MIN PRN
Status: DISCONTINUED | OUTPATIENT
Start: 2024-07-25 | End: 2024-07-25 | Stop reason: HOSPADM

## 2024-07-25 RX ORDER — OXYCODONE HYDROCHLORIDE 5 MG/1
10 TABLET ORAL EVERY 4 HOURS PRN
Status: DISCONTINUED | OUTPATIENT
Start: 2024-07-25 | End: 2024-07-25 | Stop reason: HOSPADM

## 2024-07-25 RX ORDER — ACETAMINOPHEN 500 MG
1000 TABLET ORAL EVERY 6 HOURS PRN
COMMUNITY
Start: 2024-07-25 | End: 2024-09-09

## 2024-07-25 RX ORDER — ACETAMINOPHEN 325 MG/1
975 TABLET ORAL ONCE
Status: COMPLETED | OUTPATIENT
Start: 2024-07-25 | End: 2024-07-25

## 2024-07-25 RX ORDER — LIDOCAINE 40 MG/G
CREAM TOPICAL
Status: DISCONTINUED | OUTPATIENT
Start: 2024-07-25 | End: 2024-07-25 | Stop reason: HOSPADM

## 2024-07-25 RX ADMIN — PIPERACILLIN AND TAZOBACTAM 3.38 G: 3; .375 INJECTION, POWDER, FOR SOLUTION INTRAVENOUS at 06:25

## 2024-07-25 RX ADMIN — FENTANYL CITRATE 50 MCG: 50 INJECTION INTRAMUSCULAR; INTRAVENOUS at 08:37

## 2024-07-25 RX ADMIN — MIDAZOLAM 2 MG: 1 INJECTION INTRAMUSCULAR; INTRAVENOUS at 08:34

## 2024-07-25 RX ADMIN — ACETAMINOPHEN 975 MG: 325 TABLET, FILM COATED ORAL at 08:07

## 2024-07-25 RX ADMIN — FENTANYL CITRATE 25 MCG: 50 INJECTION, SOLUTION INTRAMUSCULAR; INTRAVENOUS at 10:31

## 2024-07-25 RX ADMIN — SODIUM CHLORIDE, POTASSIUM CHLORIDE, SODIUM LACTATE AND CALCIUM CHLORIDE: 600; 310; 30; 20 INJECTION, SOLUTION INTRAVENOUS at 06:07

## 2024-07-25 RX ADMIN — PROPOFOL 50 MCG/KG/MIN: 10 INJECTION, EMULSION INTRAVENOUS at 08:43

## 2024-07-25 RX ADMIN — Medication 2 G: at 08:32

## 2024-07-25 RX ADMIN — ROCURONIUM BROMIDE 50 MG: 50 INJECTION, SOLUTION INTRAVENOUS at 08:38

## 2024-07-25 RX ADMIN — DEXAMETHASONE SODIUM PHOSPHATE 8 MG: 4 INJECTION, SOLUTION INTRA-ARTICULAR; INTRALESIONAL; INTRAMUSCULAR; INTRAVENOUS; SOFT TISSUE at 08:38

## 2024-07-25 RX ADMIN — ONDANSETRON 4 MG: 2 INJECTION INTRAMUSCULAR; INTRAVENOUS at 09:19

## 2024-07-25 RX ADMIN — FENTANYL CITRATE 50 MCG: 50 INJECTION INTRAMUSCULAR; INTRAVENOUS at 08:42

## 2024-07-25 RX ADMIN — PROPOFOL 200 MG: 10 INJECTION, EMULSION INTRAVENOUS at 08:37

## 2024-07-25 RX ADMIN — OXYCODONE HYDROCHLORIDE 5 MG: 5 TABLET ORAL at 10:35

## 2024-07-25 RX ADMIN — SUGAMMADEX 200 MG: 100 INJECTION, SOLUTION INTRAVENOUS at 09:25

## 2024-07-25 RX ADMIN — HYDROMORPHONE HYDROCHLORIDE 1 MG: 1 INJECTION, SOLUTION INTRAMUSCULAR; INTRAVENOUS; SUBCUTANEOUS at 08:54

## 2024-07-25 RX ADMIN — LIDOCAINE HYDROCHLORIDE 50 MG: 20 INJECTION, SOLUTION INFILTRATION; PERINEURAL at 08:37

## 2024-07-25 ASSESSMENT — ACTIVITIES OF DAILY LIVING (ADL)
ADLS_ACUITY_SCORE: 31

## 2024-07-25 NOTE — PLAN OF CARE
PRIMARY DIAGNOSIS: Appendicitis  OUTPATIENT/OBSERVATION GOALS TO BE MET BEFORE DISCHARGE:  ADLs back to baseline: Yes    Activity and level of assistance: Ambulating independently.    Pain status: Pain free.    Return to near baseline physical activity: Yes     Discharge Planner Nurse   Safe discharge environment identified: Yes  Barriers to discharge: Yes       Entered by: Zhane Childers RN 07/25/2024 7:36 AM  Pt AO x4. VSS on RA, LS clear, BS active. Pt up independently. Currently NPO.  PIV infusing  mL/hr, receiving zosyn q6hrs. Pt denied pain. Plan for surgery at 0740. Will continue to monitor and provide supportive cares.    Please review provider order for any additional goals.   Nurse to notify provider when observation goals have been met and patient is ready for discharge.    Goal Outcome Evaluation:      Plan of Care Reviewed With: patient    Overall Patient Progress: improvingOverall Patient Progress: improving    Outcome Evaluation: Pt denies pain, AO x4, surgery at 740am

## 2024-07-25 NOTE — ANESTHESIA PREPROCEDURE EVALUATION
Anesthesia Pre-Procedure Evaluation    Patient: Denny Jackson   MRN: [id number] : 1975        Procedure : Procedure(s):  APPENDECTOMY, LAPAROSCOPIC          Past Medical History:   Diagnosis Date    NO ACTIVE PROBLEMS       Past Surgical History:   Procedure Laterality Date    VASECTOMY        Allergies   Allergen Reactions    Ibuprofen Hives     Rash/hives on face      Social History     Tobacco Use    Smoking status: Never     Passive exposure: Never    Smokeless tobacco: Never   Substance Use Topics    Alcohol use: Yes     Comment: maybe if even once a month      Wt Readings from Last 1 Encounters:   24 89.7 kg (197 lb 11.2 oz)        Anesthesia Evaluation   Pt has had prior anesthetic. Type: General.    No history of anesthetic complications       ROS/MED HX  ENT/Pulmonary:  - neg pulmonary ROS     Neurologic:  - neg neurologic ROS     Cardiovascular:     (+) Dyslipidemia - -   -  - -                                      METS/Exercise Tolerance:     Hematologic:  - neg hematologic  ROS     Musculoskeletal:  - neg musculoskeletal ROS     GI/Hepatic:     (+)         appendicitis,           Renal/Genitourinary:  - neg Renal ROS     Endo:  - neg endo ROS     Psychiatric/Substance Use:  - neg psychiatric ROS     Infectious Disease:  - neg infectious disease ROS     Malignancy:  - neg malignancy ROS     Other:  - neg other ROS          Physical Exam    Airway        Mallampati: I   TM distance: > 3 FB   Neck ROM: full   Mouth opening: > 3 cm    Respiratory Devices and Support         Dental  no notable dental history         Cardiovascular   cardiovascular exam normal          Pulmonary   pulmonary exam normal                OUTSIDE LABS:  CBC:   Lab Results   Component Value Date    WBC 7.8 2024    WBC 10.0 2024    HGB 15.5 2024    HGB 16.5 2024    HCT 46.3 2024    HCT 48.1 2024     (L) 2024     (L) 2024     BMP:   Lab Results   Component  "Value Date     07/24/2024    POTASSIUM 3.8 07/24/2024    CHLORIDE 99 07/24/2024    CO2 23 07/24/2024    BUN 17.1 07/24/2024    CR 1.05 07/24/2024     (H) 07/24/2024    GLC 89 09/08/2023     COAGS: No results found for: \"PTT\", \"INR\", \"FIBR\"  POC: No results found for: \"BGM\", \"HCG\", \"HCGS\"  HEPATIC:   Lab Results   Component Value Date    ALBUMIN 4.8 07/24/2024    PROTTOTAL 7.7 07/24/2024    ALT 46 07/24/2024    AST 22 07/24/2024    ALKPHOS 51 07/24/2024    BILITOTAL 1.1 07/24/2024     OTHER:   Lab Results   Component Value Date    ELLIE 9.1 07/24/2024    TSH 0.59 06/14/2019       Anesthesia Plan    ASA Status:  1    NPO Status:  NPO Appropriate    Anesthesia Type: General.     - Airway: ETT   Induction: Intravenous, Propofol.   Maintenance: Balanced.        Consents    Anesthesia Plan(s) and associated risks, benefits, and realistic alternatives discussed. Questions answered and patient/representative(s) expressed understanding.     - Discussed:     - Discussed with:  Patient            Postoperative Care    Pain management: IV analgesics, Oral pain medications, Multi-modal analgesia.   PONV prophylaxis: Ondansetron (or other 5HT-3), Dexamethasone or Solumedrol     Comments:               Steve Macdonald MD    I have reviewed the pertinent notes and labs in the chart from the past 30 days and (re)examined the patient.  Any updates or changes from those notes are reflected in this note.              # Overweight: Estimated body mass index is 29.2 kg/m  as calculated from the following:    Height as of this encounter: 1.753 m (5' 9\").    Weight as of this encounter: 89.7 kg (197 lb 11.2 oz).      "

## 2024-07-25 NOTE — DISCHARGE INSTRUCTIONS
Maximum acetaminophen (Tylenol) dose from all sources should not exceed 4 grams (4000 mg) per day. You received 975 mg of Tylenol at 8 AM. Do not take any additional Tylenol until 2 PM.    FREQUENTLY ASKED QUESTIONS AFTER SURGERY  EFREN Andrew, POWER Ugalde, NATHANIEL Lopez & AKHIL Thapa      Q:  How should my incision look?    A:  Normally your incision will appear slightly swollen with light redness directly along the incision itself as it heals.  It may feel like a bump or ridge as the healing/scarring happens, and over time (3-4 months) this bump or ridge feeling should slowly go away.  In general, clear or pink watery drainage can be normal at first as your incision heals, but should decrease over time.    Q:  How do I know if my incision is infected?  A:  Look at your incision for signs of infection, like redness around the incision spreading to surrounding skin, or drainage of cloudy or foul-smelling drainage.  If you feel warm, check your temperature to see if you are running a fever.    **If any of these things occur, please notify the nurse at our office.  We may need you to come into the office for an incision check.      Q:  How do I take care of my incision?  A:  If you have a dressing in place - Starting the day after surgery, replace the dressing 1-2 times a day until there is no further drainage from the incision.  At that time, a dressing is no longer needed.  Try to minimize tape on the skin if irritation is occurring at the tape sites.  If you have significant irritation from tape on the skin, please call the office to discuss other method of dressing your incision.    Small pieces of tape called  steri-strips  may be present directly overlying your incision; these may be removed 10 days after surgery unless otherwise specified by your surgeon.  If these tapes start to loosen at the ends, you may trim them back until they fall off or are removed.    A:  If you had  Dermabond   tissue glue used as a dressing (this causes your incision to look shiny with a clear covering over it) - This type of dressing wears off with time and does not require more dressings over the top unless it is draining around the glue as it wears off.  Do not apply ointments or lotions over the incisions until the glue has completely worn off.    Q:  There is a piece of tape or a sticky  lead  still on my skin.  Can I remove this?  A:  Sometimes the sticky  leads  used for monitoring during surgery or for evaluation in the emergency department are not all removed while you are in the hospital.  These sometimes have a tab or metal dot on them.  You can easily remove these on your own, like taking off a band-aid.  If there is a gel substance under the  lead , simply wipe/clean it off with a washcloth or paper towel.      Q:  What can I do to minimize constipation (very hard stools, or lack of stools)?  A:  Stay well hydrated.  Increase your dietary fiber intake or take a fiber supplement -with plenty of water.  Walk around frequently.  You may consider an over-the-counter stool-softener.  Your Pharmacist can assist you with choosing one that is stocked at your pharmacy.  Constipation is also one of the most common side effects of pain medication.  If you are using pain medication, be pro-active and try to PREVENT problems with constipation by taking the steps above BEFORE constipation becomes a problem.    Q:  What do I do if I need more pain medications?  A:  Call the office to receive refills.  Be aware that certain pain meds cannot be called into a pharmacy and actually require a paper prescription.  A change may be made in your pain med as you progress thru your recovery period or if you have side effects to certain meds.    --Pain meds are NOT refilled after 5pm on weekdays, and NOT AT ALL on the weekends, so please look ahead to prevent problems.    Q:  Why am I having a hard time sleeping now that I am at  home?  A:  Many medications you receive while you are in the hospital can impact your sleep for a number of days after your surgery/hospitalization.  Decreased level of activity and naps during the day may also make sleeping at night difficult.  Try to minimize day-time naps, and get up frequently during the day to walk around your home during your recovery time.  Sleep aides may be of some help, but are not recommended for long-term use.      Q:  I am having some back discomfort.  What should I do?  A:  This may be related to certain positioning that was required for your surgery, extended periods of time in bed, or other changes in your overall activity level.  You may try ice, heat, acetaminophen, or ibuprofen to treat this temporarily.  Note that many pain medications have acetaminophen in them and would state this on the prescription bottle.  Be sure not to exceed the maximum of 4000mg per day of acetaminophen.     **If the pain you are having does not resolve, is severe, or is a flare of back pain you have had on other occasions prior to surgery, please contact your primary physician for further recommendations or for an appointment to be examined at their office.    Q:  Why am I having headaches?  A:  Headaches can be caused by many things:  caffeine withdrawal, use of pain meds, dehydration, high blood pressure, lack of sleep, over-activity/exhaustion, flare-up of usual migraine headaches.  If you feel this is related to muscle tension (a band-like feeling around the head, or a pressure at the low-back of the head) you may try ice or heat to this area.  You may need to drink more fluids (try electrolyte drink like Gatorade), rest, or take your usual migraine medications.   **If your headaches do not resolve, worsen, are accompanied by other symptoms, or if your blood pressure is high, please call your primary physician for recommendation and/or examination.    Q:  I am unable to urinate.  What do I do?  A:   A small percentage of people can have difficulty urinating initially after surgery.  This includes being able to urinate only a very small amount at a time and feeling discomfort or pressure in the very low abdomen.  This is called  urinary retention , and is actually an urgent situation.  Proceed to your nearest Emergency department for evaluation (not an Urgent Care Center).  Sometimes the bladder does not work correctly after certain medications you receive during surgery, or related to certain procedures.  You may need to have a catheter placed until your bladder recovers.  When planning to go to an Emergency department, it may help to call the ER to let them know you are coming in for this problem after a surgery.  This may help you get in quicker to be evaluated.  **If you have symptoms of a urinary tract infection, please contact your primary physician for the proper evaluation and treatment.    If you have other questions, please call the office Monday thru Friday between 8am and 5pm to discuss with the nurse or physician assistant.  #[phone]    There is a surgeon ON CALL on weekday evenings and over the weekend in case of urgent need only, and may be contacted at the same number.    If you are having an emergency, call 911 or proceed to your nearest emergency department.

## 2024-07-25 NOTE — PHARMACY-ADMISSION MEDICATION HISTORY
Pharmacy Intern Admission Medication History    Admission medication history is complete. The information provided in this note is only as accurate as the sources available at the time of the update.    Information Source(s): Patient and CareEverywhere/SureScripts via in-person    Pertinent Information: None    Changes made to PTA medication list:  Added: None  Deleted: None  Changed: None    Allergies reviewed with patient and updates made in EHR: yes    Medication History Completed By: Donavan Velez 7/24/2024 9:13 PM    PTA Med List   Medication Sig Last Dose    rosuvastatin (CRESTOR) 20 MG tablet Take 20 mg by mouth at bedtime 7/23/2024 at PM

## 2024-07-25 NOTE — OP NOTE
General Surgery Operative Note      Pre-operative diagnosis: acute appendicitis   Post-operative diagnosis: same    Procedure: laparoscopic appendectomy   Surgeon: Shante Donaldson MD   Assistant(s): None   Anesthesia: General    Estimated blood loss:  Complications: 2 ml  None   Specimens: ID Type Source Tests Collected by Time Destination   1 :  Tissue Appendix SURGICAL PATHOLOGY EXAM Shante Donaldson MD 7/25/2024  9:18 AM         INDICATION FOR PROCEDURE: This is a 48 year old male who presented with abdominal pain of  day's duration. CT scan of the abdomen was consistent with acute appendicitis without evidence for abscess or perforation. We discussed operative management of appendicitis including risks and benefits, and the patient agreed to proceed.    DESCRIPTION OF PROCEDURE:  The patient was placed on the table in supine position.  General endotracheal anesthesia was induced and the abdomen was prepped and draped in standard sterile fashion.  An infraumbilical incision was made and a 12 mm Phyllis Trocar was placed under direct visualization.  Pneumoperitoneum was established and the abdomen was surveyed. A 5 mm trocar was placed in the suprapubic region, and a 5 mm trocar was placed in the left lower quadrant.  The patient was placed in Trendelenburg and right side up. The ascending colon and terminal ileum were both  densely adherent to the lateral side wall with congenital attachments. These attachments were partially taken down with laparoscopic scissors until the appendix could be identified. It appeared edematous and dilated.  The mesoappendix was divided with a ligasure device.  The base of the appendix was healthy-appearing and was divided using a white load of the endo-HADLEY stapler. The appendix was passed into an Endocatch bag and removed through the 12mm trocar site.  The right lower quadrant was inspected for hemostasis.  Hemostasis was assured.  We irrigated with sterile saline and aspirated  the effluent.  The two 5 mm trocars were removed under direct visualization to ensure no bleeding from port sites. The abdomen was evacuated of CO2.  The 12mm port site fascia was closed with 0 vicryl.  The skin of all 3 incisions was anesthetized with local anesthetic and closed with interrupted 4-0 Vicryl subcuticular sutures and skin glue.  The patient tolerated the procedure well.  Sponge and instrument counts were correct.    FINDINGS: Acute appendicitis without perforation    Shante Donaldson MD

## 2024-07-25 NOTE — CONSULTS
"[facility]  General Surgery Consult    Denny Jackson MRN# [id number]   Age: 48 year old YOB: 1975     HPI:  History is obtained from the patient. Denny Jackson is a 48 year old year old patient who has been experiencing acute periumbilical and RLQ pain for the past 2 days associated with chills, nausea, and anorexia.  Negative for associated fever and vomiting. No similar episodes of pain in the past.     Review Of Systems:  The 10 point review of systems is negative other than noted in the HPI.    PMH:  Past Medical History:   Diagnosis Date    NO ACTIVE PROBLEMS        PSH:  Past Surgical History:   Procedure Laterality Date    VASECTOMY         Allergies:  Allergies   Allergen Reactions    Ibuprofen Hives     Rash/hives on face       Home Medications:  No current outpatient medications on file.       Social History:  Social History     Tobacco Use    Smoking status: Never     Passive exposure: Never    Smokeless tobacco: Never   Vaping Use    Vaping status: Never Used   Substance Use Topics    Alcohol use: Yes     Comment: maybe if even once a month    Drug use: No       Family History:  No family history chronic diarrhea, inflammatory bowel disease or colon cancer.  No bleeding disorders, clotting disorders, or problems with anesthesia.    Physical Exam:  /87   Pulse 61   Temp 97.4  F (36.3  C) (Temporal)   Resp 18   Ht 1.753 m (5' 9\")   Wt 89.7 kg (197 lb 11.2 oz)   SpO2 94%   BMI 29.20 kg/m    General appearance: Resting Comfortably in bed, no apparent distress  Lungs: Breathing comfortably on room air  Heart: Regular rate and rhythm'  Abdomen: Soft, nondistended, tender to palpation in RLQ with guarding, no palpable masses or hernias  Extremities: Without clubbing, cyanosis, edema  Neurologic: Grossly intact times four extremities, alert and oriented times three  Psychiatric: Mood and affect are appropriate  Skin: Without lesions or rashes      Labs Reviewed:  Lab " Results   Component Value Date    WBC 7.8 07/25/2024    WBC 7.2 06/14/2019     Lab Results   Component Value Date    HGB 15.5 07/25/2024    HGB 15.6 06/14/2019     Lab Results   Component Value Date     07/25/2024     06/14/2019     Last Basic Metabolic Panel:  Lab Results   Component Value Date     07/24/2024      Lab Results   Component Value Date    POTASSIUM 3.8 07/24/2024     Lab Results   Component Value Date    CHLORIDE 99 07/24/2024     Lab Results   Component Value Date    ELLIE 9.1 07/24/2024     Lab Results   Component Value Date    CO2 23 07/24/2024     Lab Results   Component Value Date    BUN 17.1 07/24/2024     Lab Results   Component Value Date    CR 1.05 07/24/2024     Lab Results   Component Value Date     07/24/2024    GLC 88 01/12/2022     12/27/2019       Radiology:  All imaging studies reviewed by me.    Results for orders placed or performed during the hospital encounter of 07/24/24   CT Abdomen Pelvis w Contrast    Narrative    EXAM: CT ABDOMEN PELVIS W CONTRAST  LOCATION: [facility]  DATE: 7/24/2024    INDICATION: right lower quadrant pain  COMPARISON: None.  TECHNIQUE: CT scan of the abdomen and pelvis was performed following injection of IV contrast. Multiplanar reformats were obtained. Dose reduction techniques were used.  CONTRAST: 100 mL Isovue 370    FINDINGS:   LOWER CHEST: Normal.    HEPATOBILIARY: Fatty infiltration of liver.    PANCREAS: Normal.    SPLEEN: Mild splenomegaly.    ADRENAL GLANDS: Normal.    KIDNEYS/BLADDER: Normal.    BOWEL: Mild dilatation of appendix with slight periappendiceal soft tissue stranding consistent with early acute appendicitis.  Bowel otherwise unremarkable.    LYMPH NODES: Normal.    VASCULATURE: Normal.    PELVIC ORGANS: Normal.    MUSCULOSKELETAL: Normal.      Impression    IMPRESSION:   1.  Early acute appendicitis.         ASSESSMENT/PLAN:  The patient's history, physical exam, laboratory and  imaging studies are suspicious for acute appendicitis.  I have offered the patient a laparoscopic appendectomy.      We have had a detailed discussion of nature of appendicitis, the procedure, its risks, benefits, alternatives, recovery, postop limitations, anesthesia, bleeding, blood transfusion,  DVT, PE, postoperative infections, injury to adjacent organs and structures, open conversion, bowel resection, prolonged convalescence in the event of gangrene or perforation of the appendix, abdominal wall hernia, intraabdominal adhesions which can lead to bowel obstruction.  We have discussed interventions and treatment for these complications.  The patient understands the possibility of a diagnosis other than appendicitis.  All questions have been answered to the best of my ability.        He elects to proceed.      Pre-operative antibiotics have been given.     Shante Donaldson MD    Time spent with the patient, reviewing the EMR, reviewing laboratory and imaging studies, more than 50% of which was counseling and coordinating care:  35 minutes.

## 2024-07-25 NOTE — ANESTHESIA CARE TRANSFER NOTE
Patient: Denny Jackson    Procedure: Procedure(s):  APPENDECTOMY, LAPAROSCOPIC       Diagnosis: Acute appendicitis with localized peritonitis, without perforation, abscess, or gangrene [K35.30]  Diagnosis Additional Information: No value filed.    Anesthesia Type:   General     Note:    Oropharynx: spontaneously breathing and oral airway in place  Level of Consciousness: drowsy  Oxygen Supplementation: face mask  Level of Supplemental Oxygen (L/min / FiO2): 6  Independent Airway: airway patency satisfactory and stable  Dentition: dentition unchanged  Vital Signs Stable: post-procedure vital signs reviewed and stable  Report to RN Given: handoff report given  Patient transferred to: PACU    Handoff Report: Identifed the Patient, Identified the Reponsible Provider, Reviewed the pertinent medical history, Discussed the surgical course, Reviewed Intra-OP anesthesia mangement and issues during anesthesia, Set expectations for post-procedure period and Allowed opportunity for questions and acknowledgement of understanding      Vitals:  Vitals Value Taken Time   /107 07/25/24 0936   Temp     Pulse 70 07/25/24 0938   Resp 15 07/25/24 0938   SpO2 100 % 07/25/24 0938   Vitals shown include unfiled device data.    Electronically Signed By: IRENA Riddle CRNA  July 25, 2024  9:39 AM

## 2024-07-25 NOTE — PLAN OF CARE
PRIMARY DIAGNOSIS: Appendicitis  OUTPATIENT/OBSERVATION GOALS TO BE MET BEFORE DISCHARGE:  ADLs back to baseline: Yes    Activity and level of assistance: Ambulating independently.    Pain status: Pain free.    Return to near baseline physical activity: Yes     Discharge Planner Nurse   Safe discharge environment identified: Yes  Barriers to discharge: Yes       Entered by: Zhane Childers RN 07/25/2024 3:48 AM  Pt AO x4. VSS on RA, LS clear, BS active. Pt up independently. Currently NPO.  PIV infusing  mL/hr, receiving zosyn q6hrs. Pt denied pain. Plan for surgery at 0740. Will continue to monitor and provide supportive cares.    Please review provider order for any additional goals.   Nurse to notify provider when observation goals have been met and patient is ready for discharge.    Goal Outcome Evaluation:      Plan of Care Reviewed With: patient    Overall Patient Progress: improvingOverall Patient Progress: improving    Outcome Evaluation: Pt denies pain, AO x4, surgery at 740am

## 2024-07-25 NOTE — ANESTHESIA PROCEDURE NOTES
Airway       Patient location during procedure: OR       Procedure Start/Stop Times: 7/25/2024 8:40 AM  Staff -        CRNA: Ilene Gross APRN CRNA       Performed By: CRNA  Consent for Airway        Urgency: elective  Indications and Patient Condition       Indications for airway management: brandon-procedural       Induction type:intravenous       Mask difficulty assessment: 1 - vent by mask    Final Airway Details       Final airway type: endotracheal airway       Successful airway: ETT - single and Oral  Endotracheal Airway Details        ETT size (mm): 8.0       Cuffed: yes       Successful intubation technique: direct laryngoscopy       DL Blade Type: Jackson 2       Grade View of Cords: 1       Adjucts: stylet       Position: Right       Measured from: lips       Secured at (cm): 24       Bite block used: None    Post intubation assessment        Placement verified by: capnometry, equal breath sounds and chest rise        Number of attempts at approach: 1       Number of other approaches attempted: 0       Secured with: tape       Ease of procedure: easy       Dentition: Unchanged    Medication(s) Administered   Medication Administration Time: 7/25/2024 8:40 AM

## 2024-07-25 NOTE — ANESTHESIA POSTPROCEDURE EVALUATION
Patient: Denny Jackson    Procedure: Procedure(s):  APPENDECTOMY, LAPAROSCOPIC       Anesthesia Type:  General    Note:     Postop Pain Control: Uneventful            Sign Out: Well controlled pain   PONV: No   Neuro/Psych: Uneventful            Sign Out: Acceptable/Baseline neuro status   Airway/Respiratory: Uneventful            Sign Out: Acceptable/Baseline resp. status   CV/Hemodynamics: Uneventful            Sign Out: Acceptable CV status   Other NRE: NONE   DID A NON-ROUTINE EVENT OCCUR? No           Last vitals:  Vitals Value Taken Time   /96 07/25/24 1035   Temp 97.9  F (36.6  C) 07/25/24 1000   Pulse 75 07/25/24 1038   Resp 13 07/25/24 1038   SpO2 97 % 07/25/24 1038   Vitals shown include unfiled device data.    Electronically Signed By: Steve Macdonald MD  July 25, 2024  10:39 AM

## 2024-07-25 NOTE — PLAN OF CARE
ROOM # 232    Living Situation (if not independent, order SW consult): Home with family   Facility name:  : Spouse [facility]    Activity level at baseline: Ind  Activity level on admit: Ind    Who will be transporting you at discharge: Spouse    Patient registered to observation; given Patient Bill of Rights; given the opportunity to ask questions about observation status and their plan of care.  Patient has been oriented to the observation room, bathroom and call light is in place.    Discussed discharge goals and expectations with patient/family.     Goal Outcome Evaluation:      Plan of Care Reviewed With: patient    Overall Patient Progress: improvingOverall Patient Progress: improving    Outcome Evaluation: Pt denies pain, some tenderderness in RLQ of abdomen upon palpitation. AO x4. Surgery 740am.

## 2024-07-29 ENCOUNTER — TELEPHONE (OUTPATIENT)
Dept: FAMILY MEDICINE | Facility: CLINIC | Age: 49
End: 2024-07-29

## 2024-07-29 LAB
PATH REPORT.COMMENTS IMP SPEC: NORMAL
PATH REPORT.COMMENTS IMP SPEC: NORMAL
PATH REPORT.FINAL DX SPEC: NORMAL
PATH REPORT.GROSS SPEC: NORMAL
PATH REPORT.MICROSCOPIC SPEC OTHER STN: NORMAL
PATH REPORT.RELEVANT HX SPEC: NORMAL
PHOTO IMAGE: NORMAL

## 2024-07-29 PROCEDURE — 88304 TISSUE EXAM BY PATHOLOGIST: CPT | Mod: 26 | Performed by: PATHOLOGY

## 2024-07-29 NOTE — TELEPHONE ENCOUNTER
Patient calling and had a appendectomy last week.  States he is scheduled for COVID and pneumococcal vaccine and wondering if he should wait.  Does not appear due for these shots as not high risk.  Patient will wait for now.  Helga Israel RN

## 2024-07-30 NOTE — DISCHARGE SUMMARY
"Surgery Discharge Summary    Denny Jackson MRN# [id number]   YOB: 1975 Age: 48 year old     Date of Admission:  7/24/2024  Date of Discharge:  7/25/2024 12:17 PM  Admitting Physician:  Ilene Thapa MD  Discharging Service:  General Surgery   Primary Provider: No Ref-Primary, Physician    Discharge Diagnosis:   Principle Diagnosis:  Right lower quadrant pain [R10.31]  Acute appendicitis with localized peritonitis, without perforation, abscess, or gangrene [K35.30]    Brief HPI: Denny Jackson is a 48 year old year-old male who presented with abdominal pain of day's duration. CT scan of the abdomen was consistent with acute appendicitis without evidence for abscess or perforation. We discussed operative management of appendicitis including risks and benefits, and the patient agreed to proceed.      Hospital Course: Denny Jackson underwent laparoscopic appendectomy without complications. Please see op note for further details. The patient  recovered as anticipated.  By time of discharge, he remained afebrile, was tolerating an oral diet, had adequate pain control on oral medications and was ambulating independently thus medically appropriate for discharge to home.     Inpatient Consultations: No consultations were requested during this admission    Procedures:   Procedure(s):  APPENDECTOMY, LAPAROSCOPIC     Disposition:   Discharged to home     Discharge Condition  Discharge condition: Stable   Discharge vitals: Blood pressure 139/84, pulse 73, temperature 97  F (36.1  C), temperature source Temporal, resp. rate 14, height 1.753 m (5' 9\"), weight 89.7 kg (197 lb 11.2 oz), SpO2 95%.     Discharge Medications:   Discharge Medication List as of 7/25/2024 11:21 AM        START taking these medications    Details   acetaminophen (TYLENOL) 500 MG tablet Take 2 tablets (1,000 mg) by mouth every 6 hours as needed for pain, OTC           CONTINUE these medications which have CHANGED    Details   oxyCODONE " (ROXICODONE) 5 MG tablet Take 1-2 tablets (5-10 mg) by mouth every 4 hours as needed for moderate to severe pain, Disp-12 tablet, R-0, E-Prescribe           CONTINUE these medications which have NOT CHANGED    Details   rosuvastatin (CRESTOR) 20 MG tablet Take 20 mg by mouth at bedtime, Historical             Discharge Instructions:     After Care Instructions       Diet Instructions      You may drink clear liquids (apple juice, ginger ale, 7-up, broth, etc.) and progress to your regular diet as you feel able. It is important to stay well-hydrated after surgery and drink plenty of water.        Discharge Instructions - Comfort and Pain Management      Pain after surgery is normal and expected. You will have some amount of pain after surgery. Your pain will improve with time. There are several things you can do to help reduce your pain including: rest, ice, and using pain medications as needed. Use pain interventions and don't wait until pain level is out of control. Take over the counter pain medications such as acetaminophen (Tylenol) and ibuprofen (Motrin/Advil) in doses recommended by your surgeon.    After laparoscopic surgery, some patients experience shoulder pain. This is referred pain from stretch of the abdominal wall and diaphragm during surgery. Typically this pain resolves after about 48 hours.     Contact your Surgeon Team if you have pain that persists or worsens after surgery despite rest, ice, and taking your medications as prescribed. You may have a dry mouth, a sore throat, muscles aches or trouble sleeping, and these symptoms should go away after 24 hours.        Discharge Instructions - Follow-up Appointment (Weeks)      FOLLOW-UP AFTER SURGERY:  - Our office will contact you approximately 2-3 weeks after surgery to check on your progress and answer any questions you may have.  If you are doing well, you will not need to return for an office appointment.  If any concerns are identified over the  phone, we will help you make an appointment to see a provider.      - If you have not received a phone call, have any questions or concerns, or would like to be seen, please call us at [phone].  We are located at: [address] [facility], [address]; [place] [address]        Discharge Instructions - Rest      Rest and relax for the next 24 hours. Make arrangements to have someone stay with you overnight, and avoid hazardous and strenuous activities.  Do NOT make any important decisions for the next 24 hours.        Ice to affected area      Ice to operative site PRN        Incision Care      Keep incisions clean and dry.  Wash your hands with soap and warm water before you touch the site of surgery. You have skin glue on your surgical sites.  Leave the glue in place until it flakes off on its own. You have stitches underneath the skin which will dissolve over several weeks. Do not apply any creams or lotions to skin until the glue is off and you are at least 2 weeks out from your operation.        No Alcohol      Do NOT drink alcoholic beverages for 24 hours following your surgery and while taking pain medications.        No driving or operating machinery      Do NOT drive any vehicle or operate mechanical equipment for 24 hours following the end of your surgery.  Even though you may feel normal, your reactions may be affected by Anesthesia medication you received.        No lifting      No lifting over 20 pounds and no strenuous physical activity for 2 weeks.        Shower/Bathing - Restrictions (specify)      Shower/Bathing - Starting after 24 hours, you may shower. Let water run over incisions and pat dry. Do NOT soak incision in tub or go swimming in a lake/pool for 2 weeks postoperatively.        When to call - Contact Surgeon Team      You may experience symptoms that require follow-up.  Contact your Surgeon Team if you are concerned about pain control, bleeding, constipation, or if you experience signs of  infection (fever, growing tenderness at the surgery site, a large amount of drainage, severe pain, foul-smelling drainage, redness or swelling).                I did not personally see or examine the patient on the day of discharge.  Summary was completed by chart review.   Katherine Swan PA-C

## 2024-08-15 ENCOUNTER — TELEPHONE (OUTPATIENT)
Dept: SURGERY | Facility: CLINIC | Age: 49
End: 2024-08-15
Payer: COMMERCIAL

## 2024-08-15 NOTE — CONFIDENTIAL NOTE
Attempted to call patient for post op check.  No answer.  Message was left for patient to call back if they had any questions of concerns.     Katherine Swan PA-C

## 2024-09-09 ENCOUNTER — OFFICE VISIT (OUTPATIENT)
Dept: FAMILY MEDICINE | Facility: CLINIC | Age: 49
End: 2024-09-09
Attending: PHYSICIAN ASSISTANT
Payer: COMMERCIAL

## 2024-09-09 VITALS
RESPIRATION RATE: 15 BRPM | DIASTOLIC BLOOD PRESSURE: 84 MMHG | BODY MASS INDEX: 29.96 KG/M2 | HEART RATE: 68 BPM | TEMPERATURE: 98 F | SYSTOLIC BLOOD PRESSURE: 128 MMHG | HEIGHT: 69 IN | OXYGEN SATURATION: 97 % | WEIGHT: 202.3 LBS

## 2024-09-09 DIAGNOSIS — E78.00 HYPERCHOLESTEROLEMIA WITH LDL GREATER THAN 190 MG/DL: ICD-10-CM

## 2024-09-09 DIAGNOSIS — K76.0 HEPATIC STEATOSIS: ICD-10-CM

## 2024-09-09 DIAGNOSIS — Z80.42 FAMILY HISTORY OF PROSTATE CANCER IN FATHER: ICD-10-CM

## 2024-09-09 DIAGNOSIS — E78.2 MIXED HYPERLIPIDEMIA: ICD-10-CM

## 2024-09-09 DIAGNOSIS — Z00.00 ROUTINE GENERAL MEDICAL EXAMINATION AT A HEALTH CARE FACILITY: Primary | ICD-10-CM

## 2024-09-09 DIAGNOSIS — E66.3 OVERWEIGHT (BMI 25.0-29.9): ICD-10-CM

## 2024-09-09 DIAGNOSIS — D69.6 LOW PLATELET COUNT (H): ICD-10-CM

## 2024-09-09 PROBLEM — R10.31 RIGHT LOWER QUADRANT PAIN: Status: RESOLVED | Noted: 2024-07-24 | Resolved: 2024-09-09

## 2024-09-09 PROBLEM — K40.90 RIGHT INGUINAL HERNIA: Status: ACTIVE | Noted: 2019-12-27

## 2024-09-09 PROBLEM — K35.30 ACUTE APPENDICITIS WITH LOCALIZED PERITONITIS, WITHOUT PERFORATION, ABSCESS, OR GANGRENE: Status: RESOLVED | Noted: 2024-07-24 | Resolved: 2024-09-09

## 2024-09-09 LAB
BASOPHILS # BLD AUTO: 0 10E3/UL (ref 0–0.2)
BASOPHILS NFR BLD AUTO: 0 %
CHOLEST SERPL-MCNC: 138 MG/DL
EOSINOPHIL # BLD AUTO: 0.3 10E3/UL (ref 0–0.7)
EOSINOPHIL NFR BLD AUTO: 5 %
ERYTHROCYTE [DISTWIDTH] IN BLOOD BY AUTOMATED COUNT: 12.8 % (ref 10–15)
FASTING STATUS PATIENT QL REPORTED: YES
FERRITIN SERPL-MCNC: 356 NG/ML (ref 31–409)
HBV CORE AB SERPL QL IA: NONREACTIVE
HBV SURFACE AB SERPL IA-ACNC: 63.4 M[IU]/ML
HBV SURFACE AB SERPL IA-ACNC: REACTIVE M[IU]/ML
HBV SURFACE AG SERPL QL IA: NONREACTIVE
HCT VFR BLD AUTO: 43.6 % (ref 40–53)
HCV AB SERPL QL IA: NONREACTIVE
HDLC SERPL-MCNC: 40 MG/DL
HGB BLD-MCNC: 15.1 G/DL (ref 13.3–17.7)
IMM GRANULOCYTES # BLD: 0 10E3/UL
IMM GRANULOCYTES NFR BLD: 0 %
IRON BINDING CAPACITY (ROCHE): 330 UG/DL (ref 240–430)
IRON SATN MFR SERPL: 34 % (ref 15–46)
IRON SERPL-MCNC: 112 UG/DL (ref 61–157)
LDLC SERPL CALC-MCNC: 85 MG/DL
LYMPHOCYTES # BLD AUTO: 1.2 10E3/UL (ref 0.8–5.3)
LYMPHOCYTES NFR BLD AUTO: 21 %
MCH RBC QN AUTO: 29.3 PG (ref 26.5–33)
MCHC RBC AUTO-ENTMCNC: 34.6 G/DL (ref 31.5–36.5)
MCV RBC AUTO: 85 FL (ref 78–100)
MONOCYTES # BLD AUTO: 0.4 10E3/UL (ref 0–1.3)
MONOCYTES NFR BLD AUTO: 7 %
NEUTROPHILS # BLD AUTO: 3.7 10E3/UL (ref 1.6–8.3)
NEUTROPHILS NFR BLD AUTO: 67 %
NONHDLC SERPL-MCNC: 98 MG/DL
PLATELET # BLD AUTO: 162 10E3/UL (ref 150–450)
RBC # BLD AUTO: 5.16 10E6/UL (ref 4.4–5.9)
TRIGL SERPL-MCNC: 67 MG/DL
WBC # BLD AUTO: 5.6 10E3/UL (ref 4–11)

## 2024-09-09 PROCEDURE — 80061 LIPID PANEL: CPT | Performed by: PHYSICIAN ASSISTANT

## 2024-09-09 PROCEDURE — 99396 PREV VISIT EST AGE 40-64: CPT | Mod: 25 | Performed by: PHYSICIAN ASSISTANT

## 2024-09-09 PROCEDURE — 83540 ASSAY OF IRON: CPT | Performed by: PHYSICIAN ASSISTANT

## 2024-09-09 PROCEDURE — 86706 HEP B SURFACE ANTIBODY: CPT | Performed by: PHYSICIAN ASSISTANT

## 2024-09-09 PROCEDURE — 85025 COMPLETE CBC W/AUTO DIFF WBC: CPT | Performed by: PHYSICIAN ASSISTANT

## 2024-09-09 PROCEDURE — 86704 HEP B CORE ANTIBODY TOTAL: CPT | Performed by: PHYSICIAN ASSISTANT

## 2024-09-09 PROCEDURE — 87340 HEPATITIS B SURFACE AG IA: CPT | Performed by: PHYSICIAN ASSISTANT

## 2024-09-09 PROCEDURE — 90656 IIV3 VACC NO PRSV 0.5 ML IM: CPT | Performed by: PHYSICIAN ASSISTANT

## 2024-09-09 PROCEDURE — 82728 ASSAY OF FERRITIN: CPT | Performed by: PHYSICIAN ASSISTANT

## 2024-09-09 PROCEDURE — 99214 OFFICE O/P EST MOD 30 MIN: CPT | Mod: 25 | Performed by: PHYSICIAN ASSISTANT

## 2024-09-09 PROCEDURE — 90471 IMMUNIZATION ADMIN: CPT | Performed by: PHYSICIAN ASSISTANT

## 2024-09-09 PROCEDURE — 83550 IRON BINDING TEST: CPT | Performed by: PHYSICIAN ASSISTANT

## 2024-09-09 PROCEDURE — 86803 HEPATITIS C AB TEST: CPT | Performed by: PHYSICIAN ASSISTANT

## 2024-09-09 PROCEDURE — 36415 COLL VENOUS BLD VENIPUNCTURE: CPT | Performed by: PHYSICIAN ASSISTANT

## 2024-09-09 RX ORDER — KETOCONAZOLE 20 MG/ML
SHAMPOO TOPICAL DAILY PRN
COMMUNITY
Start: 2024-08-04

## 2024-09-09 RX ORDER — ROSUVASTATIN CALCIUM 20 MG/1
20 TABLET, COATED ORAL AT BEDTIME
Qty: 90 TABLET | Refills: 3 | Status: SHIPPED | OUTPATIENT
Start: 2024-09-09

## 2024-09-09 ASSESSMENT — PAIN SCALES - GENERAL: PAINLEVEL: NO PAIN (0)

## 2024-09-09 NOTE — PROGRESS NOTES
"Preventive Care Visit  [facility] DERICK Hawkins PA-C, Family Medicine  Sep 9, 2024      Assessment & Plan     Routine general medical examination at a health care facility    Mixed hyperlipidemia  Hypercholesterolemia with LDL greater than 190 mg/dL  Likely familial. Well controlled with rosuvastatin 20 mg daily.  - rosuvastatin (CRESTOR) 20 MG tablet; Take 1 tablet (20 mg) by mouth at bedtime.  - Lipid panel reflex to direct LDL Fasting; Future    Low platelet count (H24)  Noted 7/2024 when he was undergoing evaluation for appendicitis. Will recheck today.  - CBC with platelets and differential; Future    Hepatic steatosis  Noted on abdominal CT scan 7/2024. Likely metabolic associated in the setting of overweight (BMI 29.6), hyperlipidemia (controlled with rosuvastatin). No history of diabetes or HTN. He has had normal liver enzymes. Social alcohol use - at most 2-3 drinks per week but does not drink every week. Will check additional labs below and follow-up per results. Repeating platelet count as above. Discussed healthy lifestyle management.  - Iron and iron binding capacity; Future  - Ferritin; Future  - Hepatitis B surface antigen; Future  - Hepatitis B core antibody; Future  - Hepatitis B Surface Antibody; Future  - Hepatitis C Screen Reflex to HCV RNA Quant and Genotype; Future    Family history of prostate cancer in father  He had recent normal PSA 7/2024    Overweight (BMI 25.0-29.9)  BMI  Estimated body mass index is 29.66 kg/m  as calculated from the following:    Height as of this encounter: 1.759 m (5' 9.25\").    Weight as of this encounter: 91.8 kg (202 lb 4.8 oz).   Weight management plan: Discussed healthy diet and exercise guidelines      Counseling  Appropriate preventive services were discussed with this patient.  Checklist reviewing preventive services available has been given to the patient.      Klaudia Baldwin is a 48 year old, presenting for the " following:  Physical and Lipids        9/9/2024     7:18 AM   Additional Questions   Roomed by Itzel MAI LPN        Health Care Directive  Patient does not have a Health Care Directive or Living Will: Discussed advance care planning with patient; however, patient declined at this time.    HPI    Hyperlipidemia Follow-Up    Are you regularly taking any medication or supplement to lower your cholesterol?   Yes- Rosuvastatin 20 mg daily  Are you having muscle aches or other side effects that you think could be caused by your cholesterol lowering medication?  No        9/9/2024   General Health   How would you rate your overall physical health? Good   Feel stress (tense, anxious, or unable to sleep) Not at all            9/9/2024   Nutrition   Three or more servings of calcium each day? Yes   Diet: Regular (no restrictions)   How many servings of fruit and vegetables per day? (!) 2-3   How many sweetened beverages each day? 0-1            9/9/2024   Exercise   Days per week of moderate/strenous exercise 4 days            9/9/2024   Social Factors   Frequency of gathering with friends or relatives Three times a week   Worry food won't last until get money to buy more No    No   Food not last or not have enough money for food? No    No   Do you have housing? (Housing is defined as stable permanent housing and does not include staying ouside in a car, in a tent, in an abandoned building, in an overnight shelter, or couch-surfing.) Yes    Yes   Are you worried about losing your housing? No    No   Lack of transportation? No    No   Unable to get utilities (heat,electricity)? No    No       Multiple values from one day are sorted in reverse-chronological order         9/9/2024   Dental   Dentist two times every year? Yes            9/9/2024   TB Screening   Were you born outside of the [place]? No              Today's PHQ-2 Score:       1/9/2024     2:40 PM   PHQ-2 ( 1999 [employer])   Q1: Little interest or pleasure in doing things  0   Q2: Feeling down, depressed or hopeless 0   PHQ-2 Score 0   Q1: Little interest or pleasure in doing things Not at all   Q2: Feeling down, depressed or hopeless Not at all   PHQ-2 Score 0         9/9/2024   Substance Use   Alcohol more than 3/day or more than 7/wk No   Do you use any other substances recreationally? No        Social History     Tobacco Use    Smoking status: Never     Passive exposure: Never    Smokeless tobacco: Never   Vaping Use    Vaping status: Never Used   Substance Use Topics    Alcohol use: Yes     Comment: 2-3 drinks per week at most    Drug use: No           9/9/2024   STI Screening   New sexual partner(s) since last STI/HIV test? No      ASCVD Risk   The ASCVD Risk score (Dasia CABRERA, et al., 2019) failed to calculate for the following reasons:    The valid total cholesterol range is 130 to 320 mg/dL       Reviewed and updated as needed this visit by Provider   Tobacco  Allergies  Meds  Problems  Med Hx  Surg Hx  Fam Hx            Past Medical History:   Diagnosis Date    Acute appendicitis with localized peritonitis, without perforation, abscess, or gangrene 07/24/2024    NO ACTIVE PROBLEMS      Past Surgical History:   Procedure Laterality Date    LAPAROSCOPIC APPENDECTOMY N/A 7/25/2024    Procedure: APPENDECTOMY, LAPAROSCOPIC;  Surgeon: Shante Donaldson MD;  Location: [facility] OR    VASECTOMY       Lab work is in process  Labs reviewed in EPIC  BP Readings from Last 3 Encounters:   09/09/24 128/84   07/25/24 139/84   07/24/24 (!) 139/94    Wt Readings from Last 3 Encounters:   09/09/24 91.8 kg (202 lb 4.8 oz)   07/24/24 89.7 kg (197 lb 11.2 oz)   07/24/24 89.8 kg (198 lb)                  Patient Active Problem List   Diagnosis    Right inguinal hernia    Mixed hyperlipidemia    Hypercholesterolemia with LDL greater than 190 mg/dL    Hepatic steatosis    Overweight (BMI 25.0-29.9)    Family history of prostate cancer in father     Past Surgical History:   Procedure  "Laterality Date    LAPAROSCOPIC APPENDECTOMY N/A 7/25/2024    Procedure: APPENDECTOMY, LAPAROSCOPIC;  Surgeon: Shante Donaldson MD;  Location: [facility] OR    VASECTOMY         Social History     Tobacco Use    Smoking status: Never     Passive exposure: Never    Smokeless tobacco: Never   Substance Use Topics    Alcohol use: Yes     Comment: 2-3 drinks per week at most     Family History   Problem Relation Age of Onset    Myocardial Infarction Mother 73    Diabetes Father     Prostate Cancer Father     Hypertension Father     Hyperlipidemia Father     Hyperlipidemia Sister     Hyperlipidemia Brother     Diabetes Maternal Grandmother     Diabetes Maternal Grandfather     Colon Cancer No family hx of     Breast Cancer No family hx of          Current Outpatient Medications   Medication Sig Dispense Refill    ketoconazole (NIZORAL) 2 % external shampoo Apply topically daily as needed.      rosuvastatin (CRESTOR) 20 MG tablet Take 1 tablet (20 mg) by mouth at bedtime. 90 tablet 3     Allergies   Allergen Reactions    Ibuprofen Hives     Rash/hives on face     Recent Labs   Lab Test 07/24/24  1727 11/15/23  0813 09/14/23  0849 09/08/23  1358 01/12/22  1033 12/27/19  0752 06/14/19  1515   LDL  --  68  --  208* 178*   < >  --    HDL  --  39*  --  43 48   < >  --    TRIG  --  73  --  94 87   < >  --    ALT 46 28 53  --   --   --   --    CR 1.05  --   --   --   --   --   --    GFRESTIMATED 88  --   --   --   --   --   --    POTASSIUM 3.8  --   --   --   --   --   --    TSH  --   --   --   --   --   --  0.59    < > = values in this interval not displayed.          Review of Systems  Constitutional, neuro, ENT, endocrine, pulmonary, cardiac, gastrointestinal, genitourinary, musculoskeletal, integument and psychiatric systems are negative, except as otherwise noted.     Objective    Exam  /84   Pulse 68   Temp 98  F (36.7  C) (Oral)   Resp 15   Ht 1.759 m (5' 9.25\")   Wt 91.8 kg (202 lb 4.8 oz)   SpO2 97%   BMI 29.66 " "kg/m     Estimated body mass index is 29.66 kg/m  as calculated from the following:    Height as of this encounter: 1.759 m (5' 9.25\").    Weight as of this encounter: 91.8 kg (202 lb 4.8 oz).    Physical Exam  GENERAL: alert and no distress  EYES: Eyes grossly normal to inspection, PERRL and conjunctivae and sclerae normal  HENT: ear canals and TM's normal, nose and mouth without ulcers or lesions  NECK: no adenopathy, no asymmetry, masses, or scars  RESP: lungs clear to auscultation - no rales, rhonchi or wheezes  CV: regular rate and rhythm, normal S1 S2, no S3 or S4, no murmur, click or rub, no peripheral edema  ABDOMEN: soft, nontender, no hepatosplenomegaly, no masses and bowel sounds normal  MS: no gross musculoskeletal defects noted, no edema  NEURO: Normal strength and tone, mentation intact and speech normal  PSYCH: mentation appears normal, affect normal/bright        Signed Electronically by: Shante Hawkins PA-C    "

## 2024-09-09 NOTE — PATIENT INSTRUCTIONS
Patient Education   Preventive Care Advice   This is general advice given by our system to help you stay healthy. However, your care team may have specific advice just for you. Please talk to your care team about your preventive care needs.  Nutrition  Eat 5 or more servings of fruits and vegetables each day.  Try wheat bread, brown rice and whole grain pasta (instead of white bread, rice, and pasta).  Get enough calcium and vitamin D. Check the label on foods and aim for 100% of the RDA (recommended daily allowance).  Lifestyle  Exercise at least 150 minutes each week  (30 minutes a day, 5 days a week).  Do muscle strengthening activities 2 days a week. These help control your weight and prevent disease.  No smoking.  Wear sunscreen to prevent skin cancer.  Have a dental exam and cleaning every 6 months.  Yearly exams  See your health care team every year to talk about:  Any changes in your health.  Any medicines your care team has prescribed.  Preventive care, family planning, and ways to prevent chronic diseases.  Shots (vaccines)   HPV shots (up to age 26), if you've never had them before.  Hepatitis B shots (up to age 59), if you've never had them before.  COVID-19 shot: Get this shot when it's due.  Flu shot: Get a flu shot every year.  Tetanus shot: Get a tetanus shot every 10 years.  Pneumococcal, hepatitis A, and RSV shots: Ask your care team if you need these based on your risk.  Shingles shot (for age 50 and up)  General health tests  Diabetes screening:  Starting at age 35, Get screened for diabetes at least every 3 years.  If you are younger than age 35, ask your care team if you should be screened for diabetes.  Cholesterol test: At age 39, start having a cholesterol test every 5 years, or more often if advised.  Bone density scan (DEXA): At age 50, ask your care team if you should have this scan for osteoporosis (brittle bones).  Hepatitis C: Get tested at least once in your life.  STIs (sexually  transmitted infections)  Before age 24: Ask your care team if you should be screened for STIs.  After age 24: Get screened for STIs if you're at risk. You are at risk for STIs (including HIV) if:  You are sexually active with more than one person.  You don't use condoms every time.  You or a partner was diagnosed with a sexually transmitted infection.  If you are at risk for HIV, ask about PrEP medicine to prevent HIV.  Get tested for HIV at least once in your life, whether you are at risk for HIV or not.  Cancer screening tests  Cervical cancer screening: If you have a cervix, begin getting regular cervical cancer screening tests starting at age 21.  Breast cancer scan (mammogram): If you've ever had breasts, begin having regular mammograms starting at age 40. This is a scan to check for breast cancer.  Colon cancer screening: It is important to start screening for colon cancer at age 45.  Have a colonoscopy test every 10 years (or more often if you're at risk) Or, ask your provider about stool tests like a FIT test every year or Cologuard test every 3 years.  To learn more about your testing options, visit:   .  For help making a decision, visit:   https://bit.ly/rw89051.  Prostate cancer screening test: If you have a prostate, ask your care team if a prostate cancer screening test (PSA) at age 55 is right for you.  Lung cancer screening: If you are a current or former smoker ages 50 to 80, ask your care team if ongoing lung cancer screenings are right for you.  For informational purposes only. Not to replace the advice of your health care provider. Copyright   2023 [employer]. All rights reserved. Clinically reviewed by the [facility]. [employer] 108652 - REV 01/24.

## 2024-11-25 ENCOUNTER — IMMUNIZATION (OUTPATIENT)
Dept: FAMILY MEDICINE | Facility: CLINIC | Age: 49
End: 2024-11-25
Payer: COMMERCIAL

## 2024-11-25 DIAGNOSIS — Z23 NEED FOR IMMUNIZATION AGAINST INFLUENZA: Primary | ICD-10-CM

## 2024-11-25 PROCEDURE — 90480 ADMN SARSCOV2 VAC 1/ONLY CMP: CPT

## 2024-11-25 PROCEDURE — 99207 PR NO CHARGE NURSE ONLY: CPT

## 2024-11-25 PROCEDURE — 91320 SARSCV2 VAC 30MCG TRS-SUC IM: CPT

## 2025-04-09 ENCOUNTER — VIRTUAL VISIT (OUTPATIENT)
Dept: FAMILY MEDICINE | Facility: CLINIC | Age: 50
End: 2025-04-09
Payer: COMMERCIAL

## 2025-04-09 DIAGNOSIS — N52.9 ERECTILE DYSFUNCTION, UNSPECIFIED ERECTILE DYSFUNCTION TYPE: Primary | ICD-10-CM

## 2025-04-09 PROCEDURE — 98006 SYNCH AUDIO-VIDEO EST MOD 30: CPT | Performed by: PHYSICIAN ASSISTANT

## 2025-04-09 RX ORDER — SILDENAFIL 50 MG/1
50 TABLET, FILM COATED ORAL DAILY PRN
Qty: 18 TABLET | Refills: 3 | Status: SHIPPED | OUTPATIENT
Start: 2025-04-09

## 2025-04-09 NOTE — PROGRESS NOTES
Denny is a 49 year old who is being evaluated via a billable video visit.    How would you like to obtain your AVS? MyChart  If the video visit is dropped, the invitation should be resent by: Text to cell phone: [phone]  Will anyone else be joining your video visit? No      Assessment & Plan     Erectile dysfunction, unspecified erectile dysfunction type  Concerns with difficulty maintaining erections though no problem attaining erection. He does not have morning erections. He feels well, no chest pain or shortness of breath. 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 No relationship or libido concerns. He has familial hyperlipidemia which is well controlled with rosuvastatin - LDL 85 on 9/9/24. He had normal CMP 7/2024. Normal CBC 9/2024. Will check TSH and fasting AM testosterone. Will try sildenafil. Will start with 50 mg; reduce to 25 mg if side effects occur, can increase to maximum of 100 mg if tolerating well but incomplete response. Discussed that he should take 1 hour prior to sexual activity, take on empty stomach, avoid grapefruit/grapefruit juice and alcohol. Discussed risks/benefits/side effects. Discussed that he cannot take sildenafil with any nitrates; he does not take any nitrates.  - Testosterone, total; Future  - sildenafil (VIAGRA) 50 MG tablet; Take 1 tablet (50 mg) by mouth daily as needed (erectile dysfunction). Take 1 hour prior to sexual activity. Start with 50 mg; reduce to 25 mg if side effects occur, can increase to maximum of 100 mg if tolerating well but incomplete response  - TSH with free T4 reflex; Future    The longitudinal plan of care for the diagnosis(es)/condition(s) as documented were addressed during this visit. Due to the added complexity in care, I will continue to support Denny in the subsequent management and with ongoing continuity of care.      Subjective   Denny is a 49 year old, presenting for the  following health issues:  Erectile Dysfunction    Erectile Dysfunction    History of Present Illness       Reason for visit:  Discussion and options surrounding ED   He is taking medications regularly.        Concerns with maintaining erection over last year at least  Still able to get an erection   Not having morning erections  Libido is still normal  No relationship concerns  He feels well, no chest pain or shortness of breath. 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   Non-smoker  2-3 alcoholic drinks/week at most  No drug use        Objective           Vitals:  No vitals were obtained today due to virtual visit.    Physical Exam   GENERAL: alert and no distress  EYES: Eyes grossly normal to inspection.   RESP: No audible wheeze, cough, or visible cyanosis.    SKIN: Visible skin clear.  NEURO: Cranial nerves grossly intact.  Mentation and speech appropriate for age.  PSYCH: Appropriate affect, tone, and pace of words        Video-Visit Details    Type of service:  Video Visit   Originating Location (pt. Location): Home    Distant Location (provider location):  On-site  Platform used for Video Visit: Rocco  Signed Electronically by: Shante Hawkins PA-C

## (undated) DEVICE — SU VICRYL 4-0 PS-2 18" UND J496H

## (undated) DEVICE — BLADE CLIPPER 3M 9670

## (undated) DEVICE — LINEN FULL SHEET 5511

## (undated) DEVICE — SOL NACL 0.9% IRRIG 1000ML BOTTLE 2F7124

## (undated) DEVICE — GLOVE BIOGEL PI MICRO SZ 6.5 48565

## (undated) DEVICE — LINEN POUCH DBL 5427

## (undated) DEVICE — NDL 22GA 1.5"

## (undated) DEVICE — GOWN IMPERVIOUS ZONED XLG 9041

## (undated) DEVICE — ENDO TROCAR FIRST ENTRY KII FIOS Z-THRD 05X100MM CTF03

## (undated) DEVICE — ESU CORD MONOPOLAR 10'  E0510

## (undated) DEVICE — SU VICRYL 0 UR-6 27" J603H

## (undated) DEVICE — SUCTION IRR STRYKERFLOW II W/TIP 250-070-520

## (undated) DEVICE — LINEN TOWEL PACK X10 5473

## (undated) DEVICE — GLOVE BIOGEL PI MICRO INDICATOR UNDERGLOVE SZ 6.5 48965

## (undated) DEVICE — SOL NACL 0.9% INJ 1000ML BAG 2B1324X

## (undated) DEVICE — ESU GROUND PAD ADULT W/CORD E7507

## (undated) DEVICE — ENDO TROCAR SLEEVE KII Z-THREADED 05X100MM CTS02

## (undated) DEVICE — ENDO TROCAR BLUNT TIP KII BALLOON 12X100MM C0R47

## (undated) DEVICE — ESU LIGASURE MARYLAND LAPAROSCOPIC SLR/DVDR 5MMX37CM LF1937

## (undated) DEVICE — Device

## (undated) DEVICE — ESU ELEC BLADE 2.75" COATED/INSULATED E1455

## (undated) DEVICE — BAG CLEAR TRASH 1.3M 39X33" P4040C

## (undated) DEVICE — ESU PENCIL W/HOLSTER E2350H

## (undated) DEVICE — STPL POWERED ECHELON 45MM PSEE45A

## (undated) DEVICE — LINEN HALF SHEET 5512

## (undated) RX ORDER — CEFAZOLIN SODIUM/WATER 2 G/20 ML
SYRINGE (ML) INTRAVENOUS
Status: DISPENSED
Start: 2024-07-25

## (undated) RX ORDER — FENTANYL CITRATE 50 UG/ML
INJECTION, SOLUTION INTRAMUSCULAR; INTRAVENOUS
Status: DISPENSED
Start: 2024-07-25

## (undated) RX ORDER — OXYCODONE HYDROCHLORIDE 5 MG/1
TABLET ORAL
Status: DISPENSED
Start: 2024-07-25

## (undated) RX ORDER — BUPIVACAINE HYDROCHLORIDE 5 MG/ML
INJECTION, SOLUTION EPIDURAL; INTRACAUDAL
Status: DISPENSED
Start: 2024-07-25

## (undated) RX ORDER — LIDOCAINE HYDROCHLORIDE 10 MG/ML
INJECTION, SOLUTION EPIDURAL; INFILTRATION; INTRACAUDAL; PERINEURAL
Status: DISPENSED
Start: 2024-07-25

## (undated) RX ORDER — PROPOFOL 10 MG/ML
INJECTION, EMULSION INTRAVENOUS
Status: DISPENSED
Start: 2024-07-25

## (undated) RX ORDER — GLYCOPYRROLATE 0.2 MG/ML
INJECTION, SOLUTION INTRAMUSCULAR; INTRAVENOUS
Status: DISPENSED
Start: 2024-07-25

## (undated) RX ORDER — ACETAMINOPHEN 325 MG/1
TABLET ORAL
Status: DISPENSED
Start: 2024-07-25

## (undated) RX ORDER — DEXAMETHASONE SODIUM PHOSPHATE 4 MG/ML
INJECTION, SOLUTION INTRA-ARTICULAR; INTRALESIONAL; INTRAMUSCULAR; INTRAVENOUS; SOFT TISSUE
Status: DISPENSED
Start: 2024-07-25